# Patient Record
Sex: MALE | Race: WHITE | Employment: UNEMPLOYED | URBAN - METROPOLITAN AREA
[De-identification: names, ages, dates, MRNs, and addresses within clinical notes are randomized per-mention and may not be internally consistent; named-entity substitution may affect disease eponyms.]

---

## 2017-01-12 DIAGNOSIS — G20 DYSKINESIA DUE TO PARKINSON'S DISEASE (HCC): ICD-10-CM

## 2017-01-12 DIAGNOSIS — M54.2 NECK PAIN: ICD-10-CM

## 2017-01-12 DIAGNOSIS — M47.812 CERVICAL SPONDYLOSIS WITHOUT MYELOPATHY: ICD-10-CM

## 2017-01-12 DIAGNOSIS — G20 PARKINSON'S DISEASE (HCC): ICD-10-CM

## 2017-01-12 DIAGNOSIS — G24.9 DYSKINESIA DUE TO PARKINSON'S DISEASE (HCC): ICD-10-CM

## 2017-01-12 RX ORDER — TRIHEXYPHENIDYL HYDROCHLORIDE 2 MG/1
TABLET ORAL
Qty: 360 TAB | Refills: 1 | Status: SHIPPED | OUTPATIENT
Start: 2017-01-12 | End: 2017-07-09 | Stop reason: SDUPTHER

## 2017-01-12 NOTE — TELEPHONE ENCOUNTER
Future Appointments  Date Time Provider Maira Artis   5/18/2017 11:20 AM Pete Duarte MD [address]                         Last Appointment My Department:  11/1/2016    Please advise of refill below.   Requested Prescriptions     Pending Prescriptions Disp Refills    trihexyphenidyl (ARTANE) 2 mg tablet [Pharmacy Med Name: TRIHEXYPHENIDYL 2MG TABLETS] 360 Tab 1     Sig: TAKE 1 TABLET BY MOUTH FOUR TIMES DAILY

## 2017-03-13 DIAGNOSIS — M54.16 LUMBAR BACK PAIN WITH RADICULOPATHY AFFECTING LEFT LOWER EXTREMITY: ICD-10-CM

## 2017-03-13 DIAGNOSIS — G20 PARKINSON'S DISEASE (HCC): ICD-10-CM

## 2017-03-13 DIAGNOSIS — G24.9 DYSKINESIA DUE TO PARKINSON'S DISEASE (HCC): ICD-10-CM

## 2017-03-13 DIAGNOSIS — M54.2 NECK PAIN: ICD-10-CM

## 2017-03-13 DIAGNOSIS — M47.812 CERVICAL SPONDYLOSIS WITHOUT MYELOPATHY: ICD-10-CM

## 2017-03-13 DIAGNOSIS — G20 DYSKINESIA DUE TO PARKINSON'S DISEASE (HCC): ICD-10-CM

## 2017-03-13 RX ORDER — CARBIDOPA AND LEVODOPA 25; 100 MG/1; MG/1
TABLET ORAL
Qty: 540 TAB | Refills: 1 | Status: SHIPPED | OUTPATIENT
Start: 2017-03-13 | End: 2017-05-09 | Stop reason: SDUPTHER

## 2017-03-13 NOTE — TELEPHONE ENCOUNTER
Future Appointments  Date Time Provider Maira Artis   5/18/2017 11:20 AM Tram Shine MD [address]                         Last Appointment My Department:  11/1/2016    Please advise of refill below.   Requested Prescriptions     Pending Prescriptions Disp Refills    carbidopa-levodopa (SINEMET)  mg per tablet [Pharmacy Med Name: CARBIDOPA/LEVODOPA 25-100MG TABS] 540 Tab 1     Sig: TAKE 1 1/2 TABLETS BY MOUTH 4 TIMES A DAY

## 2017-05-09 ENCOUNTER — OFFICE VISIT (OUTPATIENT)
Dept: NEUROLOGY | Age: 52
End: 2017-05-09

## 2017-05-09 VITALS
BODY MASS INDEX: 33.88 KG/M2 | WEIGHT: 264 LBS | HEART RATE: 63 BPM | DIASTOLIC BLOOD PRESSURE: 62 MMHG | SYSTOLIC BLOOD PRESSURE: 90 MMHG | OXYGEN SATURATION: 96 % | HEIGHT: 74 IN

## 2017-05-09 DIAGNOSIS — G20 PARKINSON'S DISEASE (HCC): Primary | ICD-10-CM

## 2017-05-09 RX ORDER — CARBIDOPA AND LEVODOPA 25; 100 MG/1; MG/1
1.5 TABLET ORAL
Qty: 675 TAB | Refills: 1 | Status: SHIPPED | OUTPATIENT
Start: 2017-05-09 | End: 2017-11-17 | Stop reason: SDUPTHER

## 2017-05-09 NOTE — PATIENT INSTRUCTIONS
[facility]   Statement to Patients  April 1, 2014      In an effort to ensure the large volume of patient prescription refills is processed in the most efficient and expeditious manner, we are asking our patients to assist us by calling your Pharmacy for all prescription refills, this will include also your  Mail Order Pharmacy. The pharmacy will contact our office electronically to continue the refill process. Please do not wait until the last minute to call your pharmacy. We need at least 48 hours (2days) to fill prescriptions. We also encourage you to call your pharmacy before going to  your prescription to make sure it is ready. With regard to controlled substance prescription refill requests (narcotic refills) that need to be picked up at our office, we ask your cooperation by providing us with at least 72 hours (3days) notice that you will need a refill. We will not refill narcotic prescription refill requests after 4:00pm on any weekday, Monday through Thursday, or after 2:00pm on Fridays, or on the weekends. We encourage everyone to explore another way of getting your prescription refill request processed using [email or web address], our patient web portal through our electronic medical record system. [email or web address] is an efficient and effective way to communicate your medication request directly to the office and  downloadable as an ryan on your smart phone . [email or web address] also features a review functionality that allows you to view your medication list as well as leave messages for your physician. Are you ready to get connected? If so please review the attatched instructions or speak to any of our staff to get you set up right away! Thank you so much for your cooperation. Should you have any questions please contact our Practice Administrator.     The Physicians and Staff,  [facility]          A Healthy Lifestyle: Care Instructions  Your Care Instructions  A healthy lifestyle can help you feel good, stay at a healthy weight, and have plenty of energy for both work and play. A healthy lifestyle is something you can share with your whole family. A healthy lifestyle also can lower your risk for serious health problems, such as high blood pressure, heart disease, and diabetes. You can follow a few steps listed below to improve your health and the health of your family. Follow-up care is a key part of your treatment and safety. Be sure to make and go to all appointments, and call your doctor if you are having problems. Its also a good idea to know your test results and keep a list of the medicines you take. How can you care for yourself at home? · Do not eat too much sugar, fat, or fast foods. You can still have dessert and treats now and then. The goal is moderation. · Start small to improve your eating habits. Pay attention to portion sizes, drink less juice and soda pop, and eat more fruits and vegetables. ¨ Eat a healthy amount of food. A 3-ounce serving of meat, for example, is about the size of a deck of cards. Fill the rest of your plate with vegetables and whole grains. ¨ Limit the amount of soda and sports drinks you have every day. Drink more water when you are thirsty. ¨ Eat at least 5 servings of fruits and vegetables every day. It may seem like a lot, but it is not hard to reach this goal. A serving or helping is 1 piece of fruit, 1 cup of vegetables, or 2 cups of leafy, raw vegetables. Have an apple or some carrot sticks as an afternoon snack instead of a candy bar. Try to have fruits and/or vegetables at every meal.  · Make exercise part of your daily routine. You may want to start with simple activities, such as walking, bicycling, or slow swimming. Try to be active 30 to 60 minutes every day. You do not need to do all 30 to 60 minutes all at once. For example, you can exercise 3 times a day for 10 or 20 minutes.  Moderate exercise is safe for most people, but it is always a good idea to talk to your doctor before starting an exercise program.  · Keep moving. [employer] the lawn, work in the garden, or Cm[employer]. Take the stairs instead of the elevator at work. · If you smoke, quit. People who smoke have an increased risk for heart attack, stroke, cancer, and other lung illnesses. Quitting is hard, but there are ways to boost your chance of quitting tobacco for good. ¨ Use nicotine gum, patches, or lozenges. ¨ Ask your doctor about stop-smoking programs and medicines. ¨ Keep trying. In addition to reducing your risk of diseases in the future, you will notice some benefits soon after you stop using tobacco. If you have shortness of breath or asthma symptoms, they will likely get better within a few weeks after you quit. · Limit how much alcohol you drink. Moderate amounts of alcohol (up to 2 drinks a day for men, 1 drink a day for women) are okay. But drinking too much can lead to liver problems, high blood pressure, and other health problems. Family health  If you have a family, there are many things you can do together to improve your health. · Eat meals together as a family as often as possible. · Eat healthy foods. This includes fruits, vegetables, lean meats and dairy, and whole grains. · Include your family in your fitness plan. Most people think of activities such as jogging or tennis as the way to fitness, but there are many ways you and your family can be more active. Anything that makes you breathe hard and gets your heart pumping is exercise. Here are some tips:  ¨ Walk to do errands or to take your child to school or the bus. ¨ Go for a family bike ride after dinner instead of watching TV. Where can you learn more? Go to http://von-joanne.info/. Enter Q337 in the search box to learn more about \"A Healthy Lifestyle: Care Instructions. \"  Current as of: July 26, 2016  Content Version: 11.2  © 9262-9189 [employer]. Care instructions adapted under license by [employer] (which disclaims liability or warranty for this information). If you have questions about a medical condition or this instruction, always ask your healthcare professional. [address] any warranty or liability for your use of this information.

## 2017-05-09 NOTE — MR AVS SNAPSHOT
Visit Information Date & Time Provider Department Dept. Phone Encounter #  
 5/9/2017  1:30 PM Cynthia Baumann NP [facility] [phone] [id number] Your Appointments 8/21/2017  2:00 PM  
Follow Up with Nitesh Hughes MD  
[facility]) Appt Note: Follow up $0CP tdb 5/9/17  
 [address] [place] [address], 
[address] [place] [address], [address] [phone]  
[address], [address] [facility], [address] [address] Upcoming Health Maintenance Date Due DTaP/Tdap/Td series (1 - Tdap) 3/2/1988 FOBT Q 1 YEAR AGE 50-75 3/2/2017 INFLUENZA AGE 9 TO ADULT 8/1/2017 Allergies as of 5/9/2017  Review Complete On: 5/9/2017 By: Zakia Lake LPN No Known Allergies Current Immunizations  Never Reviewed No immunizations on file. Not reviewed this visit You Were Diagnosed With   
  
 Codes Comments Parkinson's disease ([facility]    -  Primary ICD-10-CM: G20 
ICD-9-CM: 332.0 Vitals BP Pulse Height(growth percentile) Weight(growth percentile) SpO2 BMI  
 90/62 63 6' 2\" (1.88 m) 264 lb (119.7 kg) 96% 33.9 kg/m2 Smoking Status Never Smoker Vitals History BMI and BSA Data Body Mass Index Body Surface Area 33.9 kg/m 2 2.5 m 2 Preferred Pharmacy Pharmacy Name Phone [phone], [address] [place] [address] AT [place] [address] [phone] Your Updated Medication List  
  
   
This list is accurate as of: 5/9/17  2:04 PM.  Always use your most recent med list.  
  
  
  
  
 carbidopa-levodopa  mg per tablet Commonly known as:  SINEMET Take 1.5 Tabs by mouth five (5) times daily. CENTRUM SILVER Tab tablet Generic drug:  multivitamins-minerals-lutein Take  by mouth. rotigotine 4 mg/24 hour patch Commonly known as:  NEUPRO  
1 Patch by TransDERmal route daily. selegiline 5 mg capsule Commonly known as:  ELDYPRYL Take 1 Cap by mouth two (2) times a day. trihexyphenidyl 2 mg tablet Commonly known as:  ARTANE  
TAKE 1 TABLET BY MOUTH FOUR TIMES DAILY  
  
 VITAMIN D3 1,000 unit Cap Generic drug:  cholecalciferol Take  by mouth. Prescriptions Sent to Pharmacy Refills  
 carbidopa-levodopa (SINEMET)  mg per tablet 1 Sig: Take 1.5 Tabs by mouth five (5) times daily. Class: Normal  
 [facility] [address] [place] [address] [address] [place] [address] Ph #: [phone] Route: Oral  
  
Patient Instructions PRESCRIPTION REFILL POLICY [facility] Statement to Patients April 1, 2014 In an effort to ensure the large volume of patient prescription refills is processed in the most efficient and expeditious manner, we are asking our patients to assist us by calling your Pharmacy for all prescription refills, this will include also your  Mail Order Pharmacy. The pharmacy will contact our office electronically to continue the refill process. Please do not wait until the last minute to call your pharmacy. We need at least 48 hours (2days) to fill prescriptions. We also encourage you to call your pharmacy before going to  your prescription to make sure it is ready. With regard to controlled substance prescription refill requests (narcotic refills) that need to be picked up at our office, we ask your cooperation by providing us with at least 72 hours (3days) notice that you will need a refill. We will not refill narcotic prescription refill requests after 4:00pm on any weekday, Monday through Thursday, or after 2:00pm on Fridays, or on the weekends.   
  
We encourage everyone to explore another way of getting your prescription refill request processed using Kiyon, our patient web portal through our electronic medical record system. Kiyon is an efficient and effective way to communicate your medication request directly to the office and  downloadable as an ryan on your smart phone . Kiyon also features a review functionality that allows you to view your medication list as well as leave messages for your physician. Are you ready to get connected? If so please review the attatched instructions or speak to any of our staff to get you set up right away! Thank you so much for your cooperation. Should you have any questions please contact our Practice Administrator. The Physicians and Staff,  [facility] A Healthy Lifestyle: Care Instructions Your Care Instructions A healthy lifestyle can help you feel good, stay at a healthy weight, and have plenty of energy for both work and play. A healthy lifestyle is something you can share with your whole family. A healthy lifestyle also can lower your risk for serious health problems, such as high blood pressure, heart disease, and diabetes. You can follow a few steps listed below to improve your health and the health of your family. Follow-up care is a key part of your treatment and safety. Be sure to make and go to all appointments, and call your doctor if you are having problems. Its also a good idea to know your test results and keep a list of the medicines you take. How can you care for yourself at home? · Do not eat too much sugar, fat, or fast foods. You can still have dessert and treats now and then. The goal is moderation. · Start small to improve your eating habits. Pay attention to portion sizes, drink less juice and soda pop, and eat more fruits and vegetables. ¨ Eat a healthy amount of food. A 3-ounce serving of meat, for example, is about the size of a deck of cards. Fill the rest of your plate with vegetables and whole grains. ¨ Limit the amount of soda and sports drinks you have every day. Drink more water when you are thirsty. ¨ Eat at least 5 servings of fruits and vegetables every day. It may seem like a lot, but it is not hard to reach this goal. A serving or helping is 1 piece of fruit, 1 cup of vegetables, or 2 cups of leafy, raw vegetables. Have an apple or some carrot sticks as an afternoon snack instead of a candy bar. Try to have fruits and/or vegetables at every meal. 
· Make exercise part of your daily routine. You may want to start with simple activities, such as walking, bicycling, or slow swimming. Try to be active 30 to 60 minutes every day. You do not need to do all 30 to 60 minutes all at once. For example, you can exercise 3 times a day for 10 or 20 minutes. Moderate exercise is safe for most people, but it is always a good idea to talk to your doctor before starting an exercise program. 
· Keep moving. Rosana FuentesEtaphase the lawn, work in the garden, or [employer]. Take the stairs instead of the elevator at work. · If you smoke, quit. People who smoke have an increased risk for heart attack, stroke, cancer, and other lung illnesses. Quitting is hard, but there are ways to boost your chance of quitting tobacco for good. ¨ Use nicotine gum, patches, or lozenges. ¨ Ask your doctor about stop-smoking programs and medicines. ¨ Keep trying. In addition to reducing your risk of diseases in the future, you will notice some benefits soon after you stop using tobacco. If you have shortness of breath or asthma symptoms, they will likely get better within a few weeks after you quit. · Limit how much alcohol you drink. Moderate amounts of alcohol (up to 2 drinks a day for men, 1 drink a day for women) are okay. But drinking too much can lead to liver problems, high blood pressure, and other health problems. Family health If you have a family, there are many things you can do together to improve your health. · Eat meals together as a family as often as possible. · Eat healthy foods. This includes fruits, vegetables, lean meats and dairy, and whole grains. · Include your family in your fitness plan. Most people think of activities such as jogging or tennis as the way to fitness, but there are many ways you and your family can be more active. Anything that makes you breathe hard and gets your heart pumping is exercise. Here are some tips: 
¨ Walk to do errands or to take your child to school or the bus. ¨ Go for a family bike ride after dinner instead of watching TV. Where can you learn more? Go to http://vonDokogeojoanne.info/. Enter L089 in the search box to learn more about \"A Healthy Lifestyle: Care Instructions. \" Current as of: July 26, 2016 Content Version: 11.2 © 4754-8815 [employer]. Care instructions adapted under license by [employer] (which disclaims liability or warranty for this information). If you have questions about a medical condition or this instruction, always ask your healthcare professional. [address] any warranty or liability for your use of this information. Introducing [facility]! Greg Goodrich introduces mig33 patient portal. Now you can access parts of your medical record, email your doctor's office, and request medication refills online. 1. In your internet browser, go to [email or web address] 2. Click on the First Time User? Click Here link in the Sign In box. You will see the New Member Sign Up page. 3. Enter your [email or web address] Access Code exactly as it appears below. You will not need to use this code after youve completed the sign-up process. If you do not sign up before the expiration date, you must request a new code. · [email or web address] Access Code: [email or web address] Expires: 8/7/2017  1:20 PM 
 
4.  Enter the last four digits of your Social Security Number (xxxx) and Date of Birth (mm/dd/yyyy) as indicated and click Submit. You will be taken to the next sign-up page. 5. Create a Spring Metrics ID. This will be your Spring Metrics login ID and cannot be changed, so think of one that is secure and easy to remember. 6. Create a Spring Metrics password. You can change your password at any time. 7. Enter your Password Reset Question and Answer. This can be used at a later time if you forget your password. 8. Enter your e-mail address. You will receive e-mail notification when new information is available in [address] 9. Click Sign Up. You can now view and download portions of your medical record. 10. Click the Download Summary menu link to download a portable copy of your medical information. If you have questions, please visit the Frequently Asked Questions section of the Spring Metrics website. Remember, Spring Metrics is NOT to be used for urgent needs. For medical emergencies, dial 911. Now available from your iPhone and Android! Please provide this summary of care documentation to your next provider. Your primary care clinician is listed as Adelina Quezadaite. If you have any questions after today's visit, please call [phone].

## 2017-05-09 NOTE — PROGRESS NOTES
Date:           May 9, 2017    Name:  Olive Ward  :  1965  MRN:  [id number]     PCP:  Manuel Quezada MD    Chief Complaint   Patient presents with    Follow-up    Tremors         HISTORY OF PRESENT ILLNESS:  Gurmeet Green is a 50 y.o., male who presents today for follow up for Parkinson's. Since he was last seen, he tried eldrpryl and it did not help. He went back on the neupro patch 4 mg and is not sure that it helps either. When he misses a day, he does not notice much of a difference. He is falling, probably a few times a month. He tends to fall backward. Lorenso Frankel He continues with artane 4 times a day and sinemet  mg 1.5 mg qid. He thinks that medication might be wearing off when he is due for another pill. The first dose really helps, the second dose does not seem to do much, the third dose usually does help. He has a hard time keeping up with his pill schedule because he works overnight and has to adjust it. He does think that he would benefit from taking an additional dose though. He sleeps well, is not restless, but never gets a full 8 hours due to his work schedule.     2016 recap  Gurmeet Green is a 54 y.o., male who presents today for follow up for parkinsonism. He takes Sinemet  (1.5 tabs mg) qid and does find that it wears off after about 3-4 hours. He does work part time and often at night, so he will have to adjust his sinemet dosing to account for that. His feet will start shuffling and he will have more balance issues when he is due for another dose. He admits to falling about once a month. He has been doing PT for balance and does think this is helping, is doing his homework to maintain benefit. Was taking requip, and thinks that it was helpful. Was switched to the neupro patch, not sure if it has helped any more than requip. He has missed a patch here or there and can't tell a difference when he does, he still has off periods.  Wonders if he could try selegiline instead due to cost. He does sleep well at night. Denies dyskinesia or tremor. Current Outpatient Prescriptions   Medication Sig    carbidopa-levodopa (SINEMET)  mg per tablet TAKE 1 1/2 TABLETS BY MOUTH 4 TIMES A DAY    trihexyphenidyl (ARTANE) 2 mg tablet TAKE 1 TABLET BY MOUTH FOUR TIMES DAILY    rotigotine (NEUPRO) 4 mg/24 hour patch 1 Patch by TransDERmal route daily.  selegiline (ELDYPRYL) 5 mg capsule Take 1 Cap by mouth two (2) times a day.  multivitamins-minerals-lutein (CENTRUM SILVER) Tab Take  by mouth.  Cholecalciferol, Vitamin D3, (VITAMIN D3) 1,000 unit cap Take  by mouth. No current facility-administered medications for this visit. No Known Allergies  Past Medical History:   Diagnosis Date    Kidney disease     Movement disorder     Nephrolithiasis     Neurological disorder     Other ill-defined conditions     Parkinson disease    Parkinson disease ([facility]      Past Surgical History:   Procedure Laterality Date    HX VEIN STRIPPING       Social History     Social History    Marital status:      Spouse name: N/A    Number of children: N/A    Years of education: N/A     Occupational History    Not on file. Social History Main Topics    Smoking status: Never Smoker    Smokeless tobacco: Not on file    Alcohol use Yes      Comment: rare    Drug use: No    Sexual activity: Not on file     Other Topics Concern    Not on file     Social History Narrative     Family History   Problem Relation Age of Onset    Hypertension Mother     Heart Disease Mother     Parkinsonism Father     Arthritis-osteo Father     Seizures Sister          PHYSICAL EXAMINATION:    Visit Vitals    BP 90/62    Pulse 63    Ht 6' 2\" (1.88 m)    Wt 264 lb (119.7 kg)    SpO2 96%    BMI 33.9 kg/m2     General:  Well defined, nourished, and groomed individual in no acute distress. Neck: Supple, nontender, no bruits, no pain with resistance to active range of motion. Heart: Regular rate and rhythm, no murmurs, rub, or gallop. Normal S1S2. Lungs:  Clear to auscultation bilaterally with equal chest expansion, no cough, no wheeze  Musculoskeletal:  Extremities revealed no edema and had full range of motion of joints. Psych:  Good mood and bright affect    NEUROLOGICAL EXAMINATION:     Mental Status:   Alert and oriented to person, place, and time with recent and remote memory intact. Attention span and concentration are normal. Speech is fluent with a full fund of knowledge. Cranial Nerves:    II, III, IV, VI:  Visual acuity grossly intact. Pupils are equal, round, and reactive to light. Extra-ocular movements are full and fluid. No ptosis or nystagmus. V-XII: Hearing is grossly intact. Facial features are symmetric, with normal sensation and strength. The palate rises symmetrically and the tongue protrudes midline. Sternocleidomastoids 5/5. Masked facies    Motor Examination: Normal tone, bulk, and strength, 5/5 muscle strength throughout. No cogwheel rigidity  Coordination:  Finger to nose testing was normal.   No resting or intention tremor. Mild bilateral bradykinesia, worse on the left hand  Gait and Station:  Steady while walking. Normal arm swing. No pronator drift. No muscle wasting or fasciculations noted. ASSESSMENT AND PLAN    ICD-10-CM ICD-9-CM    1. [facility]'s [facility] G20 332.0 carbidopa-levodopa (SINEMET)  mg per tablet     82-year-old male seen in follow-up of Parkinson's disease. He has tried Neupro patch and Eldepryl since he was last seen, does not think that either one is helped. For the most part, he feels his problem that is that his Sinemet wears off when he is due for another dose. He finds himself very anxious to take the next one exam is unremarkable today, but he also took his Sinemet about an hour ago. 1.  Increase Sinemet to 1.5 tabs 5 times a day  2. Continue Neupro 4 mg daily  3.   Continue Artane 2 mg 4 times a day  4. Patient is instructed to call the office if he would like to increase Sinemet to 2 tabs instead of 1.5    Follow-up in 3 months, call sooner with concerns    25+ minutes, >50% discussing above/answering questions/formulating plan      Surekha Garcia NP    This note was created using voice recognition software. Despite editing, there may be syntax errors.

## 2017-07-09 DIAGNOSIS — M54.2 NECK PAIN: ICD-10-CM

## 2017-07-09 DIAGNOSIS — G20 DYSKINESIA DUE TO PARKINSON'S DISEASE (HCC): ICD-10-CM

## 2017-07-09 DIAGNOSIS — G24.9 DYSKINESIA DUE TO PARKINSON'S DISEASE (HCC): ICD-10-CM

## 2017-07-09 DIAGNOSIS — M47.812 CERVICAL SPONDYLOSIS WITHOUT MYELOPATHY: ICD-10-CM

## 2017-07-09 DIAGNOSIS — G20 PARKINSON'S DISEASE (HCC): ICD-10-CM

## 2017-07-10 RX ORDER — TRIHEXYPHENIDYL HYDROCHLORIDE 2 MG/1
TABLET ORAL
Qty: 360 TAB | Refills: 1 | Status: SHIPPED | OUTPATIENT
Start: 2017-07-10 | End: 2017-08-21 | Stop reason: SDUPTHER

## 2017-07-10 NOTE — TELEPHONE ENCOUNTER
Future Appointments  Date Time Provider Maira Steff   8/21/2017 2:00 PM Marcin Lisa MD [address]                         Last Appointment My Department:  5/9/2017    Please advise of refill below.   Requested Prescriptions     Pending Prescriptions Disp Refills    trihexyphenidyl (ARTANE) 2 mg tablet [Pharmacy Med Name: TRIHEXYPHENIDYL 2MG TABLETS] 360 Tab 1     Sig: TAKE 1 TABLET BY MOUTH FOUR TIMES DAILY

## 2017-08-21 ENCOUNTER — OFFICE VISIT (OUTPATIENT)
Dept: NEUROLOGY | Age: 52
End: 2017-08-21

## 2017-08-21 VITALS
DIASTOLIC BLOOD PRESSURE: 68 MMHG | OXYGEN SATURATION: 95 % | RESPIRATION RATE: 16 BRPM | HEART RATE: 91 BPM | HEIGHT: 74 IN | BODY MASS INDEX: 33.75 KG/M2 | SYSTOLIC BLOOD PRESSURE: 110 MMHG | WEIGHT: 263 LBS

## 2017-08-21 DIAGNOSIS — M54.40 BACK PAIN OF LUMBAR REGION WITH SCIATICA: Primary | ICD-10-CM

## 2017-08-21 DIAGNOSIS — M47.812 CERVICAL SPONDYLOSIS WITHOUT MYELOPATHY: ICD-10-CM

## 2017-08-21 DIAGNOSIS — M54.2 NECK PAIN: ICD-10-CM

## 2017-08-21 DIAGNOSIS — G20 DYSKINESIA DUE TO PARKINSON'S DISEASE (HCC): ICD-10-CM

## 2017-08-21 DIAGNOSIS — M54.16 LUMBAR BACK PAIN WITH RADICULOPATHY AFFECTING LEFT LOWER EXTREMITY: ICD-10-CM

## 2017-08-21 DIAGNOSIS — G20 PARKINSON'S DISEASE (HCC): ICD-10-CM

## 2017-08-21 DIAGNOSIS — G24.9 DYSKINESIA DUE TO PARKINSON'S DISEASE (HCC): ICD-10-CM

## 2017-08-21 RX ORDER — TRIHEXYPHENIDYL HYDROCHLORIDE 2 MG/1
2 TABLET ORAL 3 TIMES DAILY
Qty: 360 TAB | Refills: 1 | Status: SHIPPED | OUTPATIENT
Start: 2017-08-21 | End: 2018-09-06 | Stop reason: SDUPTHER

## 2017-08-21 RX ORDER — ROPINIROLE 1 MG/1
1 TABLET, FILM COATED ORAL 3 TIMES DAILY
Qty: 100 TAB | Refills: 6 | Status: SHIPPED | OUTPATIENT
Start: 2017-08-21 | End: 2017-12-05 | Stop reason: ALTCHOICE

## 2017-08-21 NOTE — PATIENT INSTRUCTIONS

## 2017-08-21 NOTE — MR AVS SNAPSHOT
Visit Information Date & Time Provider Department Dept. Phone Encounter #  
 8/21/2017  2:00 PM Mari Banks MD [facility] [phone] [id number] Follow-up Instructions Return in about 6 months (around 2/21/2018). Upcoming Health Maintenance Date Due DTaP/Tdap/Td series (1 - Tdap) 3/2/1988 FOBT Q 1 YEAR AGE 50-75 3/2/2017 INFLUENZA AGE 9 TO ADULT 8/1/2017 Allergies as of 8/21/2017  Review Complete On: 8/21/2017 By: Mari Banks MD  
 No Known Allergies Current Immunizations  Never Reviewed No immunizations on file. Not reviewed this visit You Were Diagnosed With   
  
 Codes Comments Back pain of lumbar region with sciatica    -  Primary ICD-10-CM: M54.40 ICD-9-CM: 724.2, 724.3 Neck pain     ICD-10-CM: M54.2 ICD-9-CM: 723.1 Cervical spondylosis without myelopathy     ICD-10-CM: Z67.478 ICD-9-CM: 721.0 Lumbar back pain with radiculopathy affecting left lower extremity     ICD-10-CM: M54.17 ICD-9-CM: 724.4 Parkinson's disease ([facility]     ICD-10-CM: G20 
ICD-9-CM: 332.0 Dyskinesia due to Parkinson's disease ([facility]     ICD-10-CM: G24.9, G20 
ICD-9-CM: 781.3, 332.0 Vitals BP Pulse Height(growth percentile) Weight(growth percentile) SpO2 BMI  
 110/68 91 6' 2\" (1.88 m) 263 lb (119.3 kg) 95% 33.77 kg/m2 Smoking Status Never Smoker BMI and BSA Data Body Mass Index Body Surface Area  
 33.77 kg/m 2 2.5 m 2 Preferred Pharmacy Pharmacy Name Phone [phone] [phone], [address] AT [facility] [phone] Your Updated Medication List  
  
   
This list is accurate as of: 8/21/17  2:36 PM.  Always use your most recent med list.  
  
  
  
  
 carbidopa-levodopa  mg per tablet Commonly known as:  SINEMET Take 1.5 Tabs by mouth five (5) times daily. CENTRUM SILVER Tab tablet Generic drug:  multivitamins-minerals-lutein Take  by mouth. rOPINIRole 1 mg tablet Commonly known as:  Jacinto Stare Take 1 Tab by mouth three (3) times daily. selegiline 5 mg capsule Commonly known as:  ELDYPRYL Take 1 Cap by mouth two (2) times a day. trihexyphenidyl 2 mg tablet Commonly known as:  ARTANE Take 1 Tab by mouth three (3) times daily. VITAMIN D3 1,000 unit Cap Generic drug:  cholecalciferol Take  by mouth. Prescriptions Sent to Pharmacy Refills  
 rOPINIRole (REQUIP) 1 mg tablet 6 Sig: Take 1 Tab by mouth three (3) times daily. Class: Normal  
 Pharmacy: [facility] [address] Ph #: [phone] Route: Oral  
 trihexyphenidyl (ARTANE) 2 mg tablet 1 Sig: Take 1 Tab by mouth three (3) times daily. Class: Normal  
 Pharmacy: [address] Ph #: [phone] Route: Oral  
  
Follow-up Instructions Return in about 6 months (around 2/21/2018). Patient Instructions A Healthy Lifestyle: Care Instructions Your Care Instructions A healthy lifestyle can help you feel good, stay at a healthy weight, and have plenty of energy for both work and play. A healthy lifestyle is something you can share with your whole family. A healthy lifestyle also can lower your risk for serious health problems, such as high blood pressure, heart disease, and diabetes. You can follow a few steps listed below to improve your health and the health of your family. Follow-up care is a key part of your treatment and safety. Be sure to make and go to all appointments, and call your doctor if you are having problems. Its also a good idea to know your test results and keep a list of the medicines you take. How can you care for yourself at home? · Do not eat too much sugar, fat, or fast foods. You can still have dessert and treats now and then. The goal is moderation. · Start small to improve your eating habits. Pay attention to portion sizes, drink less juice and soda pop, and eat more fruits and vegetables. ¨ Eat a healthy amount of food. A 3-ounce serving of meat, for example, is about the size of a deck of cards. Fill the rest of your plate with vegetables and whole grains. ¨ Limit the amount of soda and sports drinks you have every day. Drink more water when you are thirsty. ¨ Eat at least 5 servings of fruits and vegetables every day. It may seem like a lot, but it is not hard to reach this goal. A serving or helping is 1 piece of fruit, 1 cup of vegetables, or 2 cups of leafy, raw vegetables. Have an apple or some carrot sticks as an afternoon snack instead of a candy bar. Try to have fruits and/or vegetables at every meal. 
· Make exercise part of your daily routine. You may want to start with simple activities, such as walking, bicycling, or slow swimming. Try to be active 30 to 60 minutes every day. You do not need to do all 30 to 60 minutes all at once. For example, you can exercise 3 times a day for 10 or 20 minutes. Moderate exercise is safe for most people, but it is always a good idea to talk to your doctor before starting an exercise program. 
· Keep moving. Vito [employer] the lawn, work in the garden, or Produce Run. Take the stairs instead of the elevator at work. · If you smoke, quit. People who smoke have an increased risk for heart attack, stroke, cancer, and other lung illnesses. Quitting is hard, but there are ways to boost your chance of quitting tobacco for good. ¨ Use nicotine gum, patches, or lozenges. ¨ Ask your doctor about stop-smoking programs and medicines. ¨ Keep trying.  
In addition to reducing your risk of diseases in the future, you will notice some benefits soon after you stop using tobacco. If you have shortness of breath or asthma symptoms, they will likely get better within a few weeks after you quit. · Limit how much alcohol you drink. Moderate amounts of alcohol (up to 2 drinks a day for men, 1 drink a day for women) are okay. But drinking too much can lead to liver problems, high blood pressure, and other health problems. Family health If you have a family, there are many things you can do together to improve your health. · Eat meals together as a family as often as possible. · Eat healthy foods. This includes fruits, vegetables, lean meats and dairy, and whole grains. · Include your family in your fitness plan. Most people think of activities such as jogging or tennis as the way to fitness, but there are many ways you and your family can be more active. Anything that makes you breathe hard and gets your heart pumping is exercise. Here are some tips: 
¨ Walk to do errands or to take your child to school or the bus. ¨ Go for a family bike ride after dinner instead of watching TV. Where can you learn more? Go to http://vonPhoneGuardjoanne.info/. Enter O574 in the search box to learn more about \"A Healthy Lifestyle: Care Instructions. \" Current as of: July 26, 2016 Content Version: 11.3 © 0585-4131 [employer]. Care instructions adapted under license by [employer] (which disclaims liability or warranty for this information). If you have questions about a medical condition or this instruction, always ask your healthcare professional. [place] [address] any warranty or liability for your use of this information. Introducing [address]! Gautam Montez introduces Kickfire patient portal. Now you can access parts of your medical record, email your doctor's office, and request medication refills online.    
 
1. In your internet browser, go to [email or web address] 2. Click on the First Time User? Click Here link in the Sign In box. You will see the New Member Sign Up page. 3. Enter your onlinetours Access Code exactly as it appears below. You will not need to use this code after youve completed the sign-up process. If you do not sign up before the expiration date, you must request a new code. · onlinetours Access Code: [email or web address] Expires: 11/19/2017  2:12 PM 
 
4. Enter the last four digits of your Social Security Number (xxxx) and Date of Birth (mm/dd/yyyy) as indicated and click Submit. You will be taken to the next sign-up page. 5. Create a YouOSt ID. This will be your onlinetours login ID and cannot be changed, so think of one that is secure and easy to remember. 6. Create a onlinetours password. You can change your password at any time. 7. Enter your Password Reset Question and Answer. This can be used at a later time if you forget your password. 8. Enter your e-mail address. You will receive e-mail notification when new information is available in [address] 9. Click Sign Up. You can now view and download portions of your medical record. 10. Click the Download Summary menu link to download a portable copy of your medical information. If you have questions, please visit the Frequently Asked Questions section of the onlinetours website. Remember, onlinetours is NOT to be used for urgent needs. For medical emergencies, dial 911. Now available from your iPhone and Android! Please provide this summary of care documentation to your next provider. Your primary care clinician is listed as Patrick Zarate. If you have any questions after today's visit, please call [phone].

## 2017-08-22 NOTE — PROGRESS NOTES
Consult    Subjective:     Gisselle Montalvo is a 50 y. o.right-handed  male seen today for evaluation of New problem of increasing difficulty trying to afford his medications, and does not think he can afford the Neupro patches anymore at the request of Dr. Kun Bermudez. Patient also having more difficulty with his balance and coordination, and I do not think he is always taking his medications right. 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 I am concerned he may be getting too much of the Artane. I asked him to decrease his Artane is 3 times a day because I am not sure that is not causing some of his problems. He does have dyskinesias from too much medication when he overuses it. He sometimes increases his Sinemet to 1-1/2 tablets 5 times a day. He thought Mirapex made him sick I offered him go back on Requip which she can afford we will start him at 1 mg 3 times a day. I told him is not much else they are available. He is on Eldepryl twice a day because he cannot afford the Azilect, so I do not think he will be able to afford the new monoamine oxidase inhibitor just approved for Parkinson's disease. His back is gotten somewhat better with physical therapy. He is limited in his finances and I am not sure he will be able to afford Jair Davila. He has had no clear focal weakness, and only migratory sensory loss in his leg. He is still able to work some, and states that he thinks he might be getting slowly better and wants to wait before any diagnostic testing done. His bowel and bladder functions normal and he has no radicular symptoms into the left leg. He is seeing also today for evaluation of his Parkinson's disease and increasing dystonia on early morning awakening, that is painful and takes several hours to resolve after he takes his medications.  He was started on Neupro patch 4 mg and has done much better since that time. Patient stopped his Requip. He has mild dyskinesia but is moving well today. He most likely had end of dose dystonia. Patient has a 16 year history of Parkinson's disease, and a problem with neck pain and tightness in his neck and headaches in the posterior head regions. He seems to show restrictive range of motion particularly to the right on exam suggesting degenerative arthritis of his neck, and cervical spine x-rays do show some mild degenerative changes at C5-6. He has Parkinson's disease which has mild fluctuations still, but overall, the patient is tolerating his medication well, and he is on Sinemet 25/100 1-1/2 pills 4-5 times a day and he is also taking Artane 2 mg 4 times a day. He is tolerating the medication well, and has not noticed any side effects, but I can see he is having some mild dyskinesia with the medicines which he says does not bother him. He's had no new weakness or sensory loss visual changes cognitive issues or gait problems. He is taking his vitamins regularly and we did advise him he needs to do that and his vitamin D. He could not afford Azilect in the past but he now has insurance and he may be restarted on it at our next visit if he needs it. Today he looks stable     His complete review of systems and symptoms was negative for any other new medical problems, complications or illnesses. He has a history of Parkinson's disease, kidney stones, neck pain and cervical spondylosis and history of vein stripping.     Past Medical History:   Diagnosis Date    Kidney disease     Movement disorder     Nephrolithiasis     Neurological disorder     Other ill-defined conditions     Parkinson disease    Parkinson disease ([address]       Past Surgical History:   Procedure Laterality Date    HX VEIN STRIPPING       Family History   Problem Relation Age of Onset    Hypertension Mother     Heart Disease Mother     Parkinsonism Father     Arthritis-osteo Father    [facility] [place] Seiz[facility] Sister       Social History   Substance Use Topics    Smoking status: Never Smoker    Smokeless tobacco: Never Used    Alcohol use Yes      Comment: rare       Current Outpatient Prescriptions   Medication Sig Dispense Refill    rOPINIRole (REQUIP) 1 mg tablet Take 1 Tab by mouth three (3) times daily. 100 Tab 6    trihexyphenidyl (ARTANE) 2 mg tablet Take 1 Tab by mouth three (3) times daily. 360 Tab 1    carbidopa-levodopa (SINEMET)  mg per tablet Take 1.5 Tabs by mouth five (5) times daily. 675 Tab 1    selegiline (ELDYPRYL) 5 mg capsule Take 1 Cap by mouth two (2) times a day. 60 Cap 5    multivitamins-minerals-lutein (CENTRUM SILVER) Tab Take  by mouth.  Cholecalciferol, Vitamin D3, (VITAMIN D3) 1,000 unit cap Take  by mouth. No Known Allergies     Review of Systems:  A comprehensive review of systems was negative except for: Neurological: positive for coordination problems, gait problems and tremor     Objective:     I      NEUROLOGICAL EXAM:    Appearance: The patient is well developed, well nourished, provides a coherent history and is in no acute distress. Mental Status: Oriented to time, place and person and the president, cognitive function and fund of knowledge seemed normal though he is a little slow mentally. Carlynn Roxana Speech is fluent without aphasia or dysarthria. Mood and affect appropriate. Cranial Nerves:   Intact visual fields. Fundi are benign. JUAN M, EOM's full, no nystagmus, no ptosis. Facial sensation is normal. Corneal reflexes are not tested. Facial movement is symmetric, but the patient has mild bilateral facial dyskinesias. Hearing is normal bilaterally. Palate is midline with normal sternocleidomastoid and trapezius muscles are normal. Tongue is midline. Motor:  5/5 strength in upper and lower proximal and distal muscles.  Normal bulk and mild increased tone with cogwheel rigidity and bradykinesia in both upper extremities right side slightly greater than the left. No fasciculations. Patient has mild dyskinesias of the face and extremities bilaterally. Patient has mild decreased range of motion of the neck on exam particularly to the right, with some tenderness of the cervical muscles. Reflexes:   Deep tendon reflexes 2+/4 and symmetrical. No Babinski or clonus present. Patient has negative straight-leg raising test in the sitting position bilaterally  Patient has mild percussion tenderness over the lower lumbar spine  Patient can almost bend over and touch his toes   Sensory:   Normal to touch, pinprick and vibration and DSS. Gait:  Abnormal gait for slight dyskinesia in his arms and legs and slightly decreased arm swing in his right upper extremity. Tremor:   No tremor noted. Cerebellar:   Mildly abnormal Romberg and tandem cerebellar signs present. Neurovascular:  Normal heart sounds and regular rhythm, peripheral pulses decreased in his feet, and no carotid bruits. Assessment:         Plan:     Parkinson's disease with increasing morning end of dose dystonia, better on Neupro patch 4 mg but he can afford that and needs a substitute  We will restart Requip 1 mg 3 times a day and advance as tolerated  Because of his increasing ataxia and unsteady gait we will try tapering him off Artane he will reduce his dose down to 3 times a day now  Cervical spondylosis with muscle contraction headaches  Lumbar radiculopathy into the right leg probably secondary to degenerative disc disease  Patient is to make sure he does his exercises for cervical spondylosis and lumbar spinal stenosis  We discussed medications in detail, and I told him I did not think we do not need to increase his medications now  Patient is to continue his neck exercises, and advised to take anti-inflammatories as needed and continue a regular exercise program.  Patient is to continue his current dose of other medications.    He's doing a little worse with his gait and dyskinesias  He is to remain mentally and physically active and start taking vitamins and vitamin D more regularly and try to exercise more. He'll be seen again in 12 months time or earlier if needed    Signed By: Heriberto Goldmann, MD     August 21, 2017       This note will not be viewable in [address]

## 2017-10-24 ENCOUNTER — TELEPHONE (OUTPATIENT)
Dept: NEUROLOGY | Age: 52
End: 2017-10-24

## 2017-10-24 NOTE — TELEPHONE ENCOUNTER
Received call from patient, he wants to switch medications, patient would indulge which medication he wanted to switch. He would like a call back.     [phone]

## 2017-10-26 ENCOUNTER — TELEPHONE (OUTPATIENT)
Dept: NEUROLOGY | Age: 52
End: 2017-10-26

## 2017-11-17 DIAGNOSIS — G20 PARKINSON'S DISEASE (HCC): ICD-10-CM

## 2017-11-17 RX ORDER — CARBIDOPA AND LEVODOPA 25; 100 MG/1; MG/1
TABLET ORAL
Qty: 675 TAB | Refills: 0 | Status: SHIPPED | OUTPATIENT
Start: 2017-11-17 | End: 2018-02-21 | Stop reason: SDUPTHER

## 2017-12-05 RX ORDER — ROTIGOTINE 4 MG/24H
PATCH, EXTENDED RELEASE TRANSDERMAL
Qty: 30 PATCH | Refills: 0 | Status: SHIPPED | OUTPATIENT
Start: 2017-12-05 | End: 2018-02-12 | Stop reason: CLARIF

## 2018-01-07 DIAGNOSIS — G24.9 DYSKINESIA DUE TO PARKINSON'S DISEASE (HCC): ICD-10-CM

## 2018-01-07 DIAGNOSIS — G20 DYSKINESIA DUE TO PARKINSON'S DISEASE (HCC): ICD-10-CM

## 2018-01-07 DIAGNOSIS — M47.812 CERVICAL SPONDYLOSIS WITHOUT MYELOPATHY: ICD-10-CM

## 2018-01-07 DIAGNOSIS — M54.2 NECK PAIN: ICD-10-CM

## 2018-01-07 DIAGNOSIS — G20 PARKINSON'S DISEASE (HCC): ICD-10-CM

## 2018-01-07 RX ORDER — TRIHEXYPHENIDYL HYDROCHLORIDE 2 MG/1
TABLET ORAL
Qty: 360 TAB | Refills: 0 | Status: SHIPPED | OUTPATIENT
Start: 2018-01-07 | End: 2018-02-12

## 2018-01-08 ENCOUNTER — TELEPHONE (OUTPATIENT)
Dept: NEUROLOGY | Age: 53
End: 2018-01-08

## 2018-01-08 NOTE — TELEPHONE ENCOUNTER
Pharmacy would like clarification on direction for trihexphenidyl an old prescription has pt taking medication 4 times daily with 360 pills and a new prescription was written for 3 times daily with 360 pills

## 2018-01-08 NOTE — TELEPHONE ENCOUNTER
Confirmed with the pharmacy that per note from 8/21/17, patient is to decrease to three times daily of this medication and he verbalized understanding

## 2018-01-23 DIAGNOSIS — M47.812 CERVICAL SPONDYLOSIS WITHOUT MYELOPATHY: ICD-10-CM

## 2018-01-23 DIAGNOSIS — M43.16 SPONDYLOLISTHESIS OF LUMBAR REGION: ICD-10-CM

## 2018-01-23 DIAGNOSIS — G20 DYSKINESIA DUE TO PARKINSON'S DISEASE (HCC): ICD-10-CM

## 2018-01-23 DIAGNOSIS — G24.9 DYSKINESIA DUE TO PARKINSON'S DISEASE (HCC): ICD-10-CM

## 2018-01-23 DIAGNOSIS — G20 PARKINSON'S DISEASE (HCC): ICD-10-CM

## 2018-01-23 DIAGNOSIS — M54.16 LUMBAR BACK PAIN WITH RADICULOPATHY AFFECTING LEFT LOWER EXTREMITY: ICD-10-CM

## 2018-01-23 DIAGNOSIS — M54.2 NECK PAIN: ICD-10-CM

## 2018-01-23 RX ORDER — ROTIGOTINE 4 MG/24H
PATCH, EXTENDED RELEASE TRANSDERMAL
Qty: 30 PATCH | Refills: 0 | Status: SHIPPED | OUTPATIENT
Start: 2018-01-23 | End: 2018-02-12 | Stop reason: CLARIF

## 2018-02-08 ENCOUNTER — TELEPHONE (OUTPATIENT)
Dept: NEUROLOGY | Age: 53
End: 2018-02-08

## 2018-02-12 ENCOUNTER — OFFICE VISIT (OUTPATIENT)
Dept: NEUROLOGY | Age: 53
End: 2018-02-12

## 2018-02-12 VITALS
HEART RATE: 67 BPM | WEIGHT: 262 LBS | BODY MASS INDEX: 33.62 KG/M2 | DIASTOLIC BLOOD PRESSURE: 74 MMHG | SYSTOLIC BLOOD PRESSURE: 98 MMHG | HEIGHT: 74 IN | RESPIRATION RATE: 16 BRPM | OXYGEN SATURATION: 98 % | TEMPERATURE: 98 F

## 2018-02-12 DIAGNOSIS — M54.16 LUMBAR BACK PAIN WITH RADICULOPATHY AFFECTING LEFT LOWER EXTREMITY: ICD-10-CM

## 2018-02-12 DIAGNOSIS — G20 PARKINSON'S DISEASE (HCC): ICD-10-CM

## 2018-02-12 DIAGNOSIS — G24.9 DYSKINESIA DUE TO PARKINSON'S DISEASE (HCC): Primary | ICD-10-CM

## 2018-02-12 DIAGNOSIS — G20 DYSKINESIA DUE TO PARKINSON'S DISEASE (HCC): Primary | ICD-10-CM

## 2018-02-12 DIAGNOSIS — M47.812 CERVICAL SPONDYLOSIS WITHOUT MYELOPATHY: ICD-10-CM

## 2018-02-12 DIAGNOSIS — M54.40 BACK PAIN OF LUMBAR REGION WITH SCIATICA: ICD-10-CM

## 2018-02-12 RX ORDER — SELEGILINE HYDROCHLORIDE 5 MG/1
5 CAPSULE ORAL 2 TIMES DAILY
Qty: 60 CAP | Refills: 5 | Status: SHIPPED | OUTPATIENT
Start: 2018-02-12 | End: 2018-05-16 | Stop reason: CLARIF

## 2018-02-12 NOTE — LETTER
2/12/2018 8:20 PM 
 
Patient:  Jabier Canseco YOB: 1965 Date of Visit: 2/12/2018 Dear No Recipients: Thank you for referring Mr. Kamala Tovar to me for evaluation/treatment. Below are the relevant portions of my assessment and plan of care. Consult Subjective:  
 
Jabier Canseco is a 50 y. o.right-handed  male seen today for evaluation of New problem of wanting to switch his medications to Eldepryl, because his dad is doing better on that medication, and for increasing difficulty trying to afford his medications, and does not think he can afford the Neupro patches anymore at the request of Dr. Arturo Ortiz, and wanted to switch back to Requip. We told the patient to go ahead and finish off his Neupro patches, and we gave her new prescription for the Eldepryl 5 mg twice a day at breakfast and lunch, and once he has finished his patches, he can start the Requip 1 mg 3 times a day. He is concerned that may be a low dose and I told him we can advance it fairly quickly but we have started low and workup. The patches worked very well, but he just cannot afford them. I asked him to continue his Artane is 3 times a day because I am not sure that is not causing some of his problems when he was taking 5 a day, with ataxia and more unsteadiness. Donna Echevarria He does have dyskinesias from too much medication when he overuses it. He sometimes increases his Sinemet to 1-1/2 tablets 5 times a day. He thought Mirapex made him sick. His back is gotten somewhat better with physical therapy. He is limited in his finances and I am not sure he will be able to afford Rytary. He has had no clear focal weakness, and only migratory sensory loss in his leg. He is still able to work some, and states that he thinks he might be getting slowly better and wants to wait before any diagnostic testing done.  His bowel and bladder functions normal and he has no radicular symptoms into the left leg. He has mild dyskinesia but is moving well today. He most likely had end of dose dystonia. Patient has a 16 year history of Parkinson's disease, and a problem with neck pain and tightness in his neck and headaches in the posterior head regions. He seems to show restrictive range of motion particularly to the right on exam suggesting degenerative arthritis of his neck, and cervical spine x-rays do show some mild degenerative changes at C5-6. He is tolerating the medication well, and has not noticed any side effects, but I can see he is having some mild dyskinesia with the medicines which he says does not bother him. He's had no new weakness or sensory loss visual changes cognitive issues or gait problems. He is taking his vitamins regularly and we did advise him he needs to do that and his vitamin D. He could not afford Azilect in the past but he now has insurance and he may be restarted on it at our next visit if he needs it. Today he looks stable His complete review of systems and symptoms was negative for any other new medical problems, complications or illnesses. He has a history of Parkinson's disease, kidney stones, neck pain and cervical spondylosis and history of vein stripping. Past Medical History:  
Diagnosis Date  Kidney disease  Movement disorder  Nephrolithiasis  Neurological disorder  Other ill-defined conditions(799.89) Parkinson disease  Parkinson disease ([facility] [address] Past Surgical History:  
Procedure Laterality Date  HX VEIN STRIPPING Family History Problem Relation Age of Onset  Hypertension Mother  Heart Disease Mother  Parkinsonism Father  Arthritis-osteo Father  Seizures Sister Social History Substance Use Topics  Smoking status: Never Smoker  Smokeless tobacco: Never Used  Alcohol use Yes Comment: rare Current Outpatient Prescriptions Medication Sig Dispense Refill  selegiline (ELDYPRYL) 5 mg capsule Take 1 Cap by mouth two (2) times a day. 60 Cap 5  carbidopa-levodopa (SINEMET)  mg per tablet TAKE 1.5 TABLETS BY MOUTH 5 TIMES DAILY 675 Tab 0  
 trihexyphenidyl (ARTANE) 2 mg tablet Take 1 Tab by mouth three (3) times daily. 360 Tab 1  
 multivitamins-minerals-lutein (CENTRUM SILVER) Tab Take  by mouth.  Cholecalciferol, Vitamin D3, (VITAMIN D3) 1,000 unit cap Take  by mouth. No Known Allergies Review of Systems: A comprehensive review of systems was negative except for: Neurological: positive for coordination problems, gait problems and tremor Objective: I 
 
 
NEUROLOGICAL EXAM: 
 
Appearance: The patient is well developed, well nourished, provides a coherent history and is in no acute distress. Mental Status: Oriented to time, place and person and the president, cognitive function and fund of knowledge seemed normal though he is a little slow mentally. Shraddha Jason Speech is fluent without aphasia or dysarthria. Mood and affect appropriate, but anxious and depressed. Cranial Nerves:   Intact visual fields. Fundi are benign. JUAN M, EOM's full, no nystagmus, no ptosis. Facial sensation is normal. Corneal reflexes are not tested. Facial movement is symmetric, but the patient has mild bilateral facial dyskinesias. Hearing is normal bilaterally. Palate is midline with normal sternocleidomastoid and trapezius muscles are normal. Tongue is midline. Neck without meningismus or bruits Temporal arteries are not tender or enlarged Motor:  5/5 strength in upper and lower proximal and distal muscles. Normal bulk and mild increased tone with cogwheel rigidity and bradykinesia in both upper extremities right side slightly greater than the left. No fasciculations. Patient has mild dyskinesias of the face and extremities bilaterally.  Patient has mild decreased range of motion of the neck on exam particularly to the right, with some tenderness of the cervical muscles. Reflexes:   Deep tendon reflexes 2+/4 and symmetrical. No Babinski or clonus present. Patient has negative straight-leg raising test in the sitting position bilaterally Patient has mild percussion tenderness over the lower lumbar spine Patient can almost bend over and touch his toes Sensory:   Normal to touch, pinprick and vibration and DSS. Gait:  Abnormal gait for slight dyskinesia in his arms and legs and slightly decreased arm swing in his right upper extremity. Tremor:   No tremor noted. Cerebellar:   Mildly abnormal Romberg and tandem cerebellar signs present. Neurovascular:  Normal heart sounds and regular rhythm, peripheral pulses decreased in his feet, and no carotid bruits. Assessment:  
 
 
 
Plan:  
 
Should with increasing problems of being able to afford his medications, we will DC the Neupro patch, and once he is done with that, restart his Requip 1 mg 3 times a day and advance his dose up as tolerated We will give him Eldepryl 5 mg twice a day because he wants to retry that again because his father said he is doing better with that. Parkinson's disease with increasing morning end of dose dystonia, Because of his increasing ataxia and unsteady gait we tapered him on Artane he will continue his dose down to 3 times a day now Cervical spondylosis with muscle contraction headaches Lumbar radiculopathy into the right leg probably secondary to degenerative disc disease Patient is to make sure he does his exercises for cervical spondylosis and lumbar spinal stenosis Patient is to continue his neck exercises, and advised to take anti-inflammatories as needed and continue a regular exercise program. 
Patient is to continue his current dose of other medications. He's doing a little worse with his gait and dyskinesias He is to remain mentally and physically active and start taking vitamins and vitamin D more regularly and try to exercise more. He'll be seen again in 6 months time or earlier if needed Time of visit was 35 minutes today, counseling the patient, going over his medications, discussing therapeutic options with him in detail. Signed By: Anthony Ordonez MD   
 February 12, 2018 This note will not be viewable in [address] If you have questions, please do not hesitate to call me. I look forward to following Mr. Jay Philippe along with you. Sincerely, Anthony Ordonez MD

## 2018-02-12 NOTE — PATIENT INSTRUCTIONS
[facility]   Statement to Patients  April 1, 2014      In an effort to ensure the large volume of patient prescription refills is processed in the most efficient and expeditious manner, we are asking our patients to assist us by calling your Pharmacy for all prescription refills, this will include also your  Mail Order Pharmacy. The pharmacy will contact our office electronically to continue the refill process. Please do not wait until the last minute to call your pharmacy. We need at least 48 hours (2days) to fill prescriptions. We also encourage you to call your pharmacy before going to  your prescription to make sure it is ready. With regard to controlled substance prescription refill requests (narcotic refills) that need to be picked up at our office, we ask your cooperation by providing us with at least 72 hours (3days) notice that you will need a refill. We will not refill narcotic prescription refill requests after 4:00pm on any weekday, Monday through Thursday, or after 2:00pm on Fridays, or on the weekends. We encourage everyone to explore another way of getting your prescription refill request processed using [employer], our patient web portal through our electronic medical record system. [employer] is an efficient and effective way to communicate your medication request directly to the office and  downloadable as an ryan on your smart phone . [employer] also features a review functionality that allows you to view your medication list as well as leave messages for your physician. Are you ready to get connected? If so please review the attatched instructions or speak to any of our staff to get you set up right away! Thank you so much for your cooperation. Should you have any questions please contact our Practice Administrator.     The Physicians and Staff,  [facility]          A Healthy Lifestyle: Care Instructions  Your Care Instructions    A healthy lifestyle can help you feel good, stay at a healthy weight, and have plenty of energy for both work and play. A healthy lifestyle is something you can share with your whole family. A healthy lifestyle also can lower your risk for serious health problems, such as high blood pressure, heart disease, and diabetes. You can follow a few steps listed below to improve your health and the health of your family. Follow-up care is a key part of your treatment and safety. Be sure to make and go to all appointments, and call your doctor if you are having problems. It's also a good idea to know your test results and keep a list of the medicines you take. How can you care for yourself at home? · Do not eat too much sugar, fat, or fast foods. You can still have dessert and treats now and then. The goal is moderation. · Start small to improve your eating habits. Pay attention to portion sizes, drink less juice and soda pop, and eat more fruits and vegetables. ¨ Eat a healthy amount of food. A 3-ounce serving of meat, for example, is about the size of a deck of cards. Fill the rest of your plate with vegetables and whole grains. ¨ Limit the amount of soda and sports drinks you have every day. Drink more water when you are thirsty. ¨ Eat at least 5 servings of fruits and vegetables every day. It may seem like a lot, but it is not hard to reach this goal. A serving or helping is 1 piece of fruit, 1 cup of vegetables, or 2 cups of leafy, raw vegetables. Have an apple or some carrot sticks as an afternoon snack instead of a candy bar. Try to have fruits and/or vegetables at every meal.  · Make exercise part of your daily routine. You may want to start with simple activities, such as walking, bicycling, or slow swimming. Try to be active 30 to 60 minutes every day. You do not need to do all 30 to 60 minutes all at once. For example, you can exercise 3 times a day for 10 or 20 minutes.  Moderate exercise is safe for most people, but it is always a good idea to talk to your doctor before starting an exercise program.  · Keep moving. Jeevan Gomez the lawn, work in the garden, or [employer]. Take the stairs instead of the elevator at work. · If you smoke, quit. People who smoke have an increased risk for heart attack, stroke, cancer, and other lung illnesses. Quitting is hard, but there are ways to boost your chance of quitting tobacco for good. ¨ Use nicotine gum, patches, or lozenges. ¨ Ask your doctor about stop-smoking programs and medicines. ¨ Keep trying. In addition to reducing your risk of diseases in the future, you will notice some benefits soon after you stop using tobacco. If you have shortness of breath or asthma symptoms, they will likely get better within a few weeks after you quit. · Limit how much alcohol you drink. Moderate amounts of alcohol (up to 2 drinks a day for men, 1 drink a day for women) are okay. But drinking too much can lead to liver problems, high blood pressure, and other health problems. Family health  If you have a family, there are many things you can do together to improve your health. · Eat meals together as a family as often as possible. · Eat healthy foods. This includes fruits, vegetables, lean meats and dairy, and whole grains. · Include your family in your fitness plan. Most people think of activities such as jogging or tennis as the way to fitness, but there are many ways you and your family can be more active. Anything that makes you breathe hard and gets your heart pumping is exercise. Here are some tips:  ¨ Walk to do errands or to take your child to school or the bus. ¨ Go for a family bike ride after dinner instead of watching TV. Where can you learn more? Go to http://von-joanne.info/. Enter I394 in the search box to learn more about \"A Healthy Lifestyle: Care Instructions. \"  Current as of:  May 12, 2017  Content Version: 11.4  © 0754-5956 [employer]. Care instructions adapted under license by [employer] (which disclaims liability or warranty for this information). If you have questions about a medical condition or this instruction, always ask your healthcare professional. [place] [address] any warranty or liability for your use of this information.

## 2018-02-12 NOTE — MR AVS SNAPSHOT
[address], 
[address], [address] [facility] 
[phone] Patient: Deb Rivera MRN: [id number] :1965 Visit Information Date & Time Provider Department Dept. Phone Encounter #  
 2018  1:40 PM Ant Saldana MD Neurology [facility] at [facility] [phone] [id number] Follow-up Instructions Return in about 6 months (around 2018). Upcoming Health Maintenance Date Due DTaP/Tdap/Td series (1 - Tdap) 3/2/1988 FOBT Q 1 YEAR AGE 50-75 3/2/2017 Influenza Age 5 to Adult 2017 Allergies as of 2018  Review Complete On: 2018 By: Ant Saldana MD  
 No Known Allergies Current Immunizations  Never Reviewed No immunizations on file. Not reviewed this visit You Were Diagnosed With   
  
 Codes Comments Dyskinesia due to Parkinson's disease ([facility]    -  Primary ICD-10-CM: G24.9, G20 
ICD-9-CM: 781.3, 332.0 Lumbar back pain with radiculopathy affecting left lower extremity     ICD-10-CM: M54.17 ICD-9-CM: 724.4 Parkinson's disease ([facility]     ICD-10-CM: G20 
ICD-9-CM: 332.0 Vitals BP Pulse Temp Resp Height(growth percentile) Weight(growth percentile) 98/74 67 98 °F (36.7 °C) 16 6' 2\" (1.88 m) 262 lb (118.8 kg) SpO2 BMI Smoking Status 98% 33.64 kg/m2 Never Smoker Vitals History BMI and BSA Data Body Mass Index Body Surface Area  
 33.64 kg/m 2 2.49 m 2 Preferred Pharmacy Pharmacy Name Phone [phone] - [address], [address] AT [facility] [phone] Your Updated Medication List  
  
   
This list is accurate as of: 18  2:19 PM.  Always use your most recent med list.  
  
  
  
  
 carbidopa-levodopa  mg per tablet Commonly known as:  SINEMET  
TAKE 1.5 TABLETS BY MOUTH 5 TIMES DAILY CENTRUM SILVER Tab tablet Generic drug:  multivitamins-minerals-lutein Take  by mouth. selegiline 5 mg capsule Commonly known as:  ELDYPRYL Take 1 Cap by mouth two (2) times a day. trihexyphenidyl 2 mg tablet Commonly known as:  ARTANE Take 1 Tab by mouth three (3) times daily. VITAMIN D3 1,000 unit Cap Generic drug:  cholecalciferol Take  by mouth. Prescriptions Sent to Pharmacy Refills  
 selegiline (ELDYPRYL) 5 mg capsule 5 Sig: Take 1 Cap by mouth two (2) times a day. Class: Normal  
 [facility] [address] [place] [address] #: [phone] Route: Oral  
  
Follow-up Instructions Return in about 6 months (around 8/12/2018). Patient Instructions PRESCRIPTION REFILL POLICY [facility] Statement to Patients April 1, 2014 In an effort to ensure the large volume of patient prescription refills is processed in the most efficient and expeditious manner, we are asking our patients to assist us by calling your Pharmacy for all prescription refills, this will include also your  Mail Order Pharmacy. The pharmacy will contact our office electronically to continue the refill process. Please do not wait until the last minute to call your pharmacy. We need at least 48 hours (2days) to fill prescriptions. We also encourage you to call your pharmacy before going to  your prescription to make sure it is ready. With regard to controlled substance prescription refill requests (narcotic refills) that need to be picked up at our office, we ask your cooperation by providing us with at least 72 hours (3days) notice that you will need a refill. We will not refill narcotic prescription refill requests after 4:00pm on any weekday, Monday through Thursday, or after 2:00pm on Fridays, or on the weekends. We encourage everyone to explore another way of getting your prescription refill request processed using OpenExchange, our patient web portal through our electronic medical record system. OpenExchange is an efficient and effective way to communicate your medication request directly to the office and  downloadable as an ryan on your smart phone . OpenExchange also features a review functionality that allows you to view your medication list as well as leave messages for your physician. Are you ready to get connected? If so please review the attatched instructions or speak to any of our staff to get you set up right away! Thank you so much for your cooperation. Should you have any questions please contact our Practice Administrator. The Physicians and Staff,  [facility] A Healthy Lifestyle: Care Instructions Your Care Instructions A healthy lifestyle can help you feel good, stay at a healthy weight, and have plenty of energy for both work and play. A healthy lifestyle is something you can share with your whole family. A healthy lifestyle also can lower your risk for serious health problems, such as high blood pressure, heart disease, and diabetes. You can follow a few steps listed below to improve your health and the health of your family. Follow-up care is a key part of your treatment and safety. Be sure to make and go to all appointments, and call your doctor if you are having problems. It's also a good idea to know your test results and keep a list of the medicines you take. How can you care for yourself at home? · Do not eat too much sugar, fat, or fast foods. You can still have dessert and treats now and then. The goal is moderation. · Start small to improve your eating habits. Pay attention to portion sizes, drink less juice and soda pop, and eat more fruits and vegetables. ¨ Eat a healthy amount of food.  A 3-ounce serving of meat, for example, is about the size of a deck of cards. Fill the rest of your plate with vegetables and whole grains. ¨ Limit the amount of soda and sports drinks you have every day. Drink more water when you are thirsty. ¨ Eat at least 5 servings of fruits and vegetables every day. It may seem like a lot, but it is not hard to reach this goal. A serving or helping is 1 piece of fruit, 1 cup of vegetables, or 2 cups of leafy, raw vegetables. Have an apple or some carrot sticks as an afternoon snack instead of a candy bar. Try to have fruits and/or vegetables at every meal. 
· Make exercise part of your daily routine. You may want to start with simple activities, such as walking, bicycling, or slow swimming. Try to be active 30 to 60 minutes every day. You do not need to do all 30 to 60 minutes all at once. For example, you can exercise 3 times a day for 10 or 20 minutes. Moderate exercise is safe for most people, but it is always a good idea to talk to your doctor before starting an exercise program. 
· Keep moving. Linda Harpin the lawn, work in the garden, or [employer]. Take the stairs instead of the elevator at work. · If you smoke, quit. People who smoke have an increased risk for heart attack, stroke, cancer, and other lung illnesses. Quitting is hard, but there are ways to boost your chance of quitting tobacco for good. ¨ Use nicotine gum, patches, or lozenges. ¨ Ask your doctor about stop-smoking programs and medicines. ¨ Keep trying. In addition to reducing your risk of diseases in the future, you will notice some benefits soon after you stop using tobacco. If you have shortness of breath or asthma symptoms, they will likely get better within a few weeks after you quit. · Limit how much alcohol you drink. Moderate amounts of alcohol (up to 2 drinks a day for men, 1 drink a day for women) are okay. But drinking too much can lead to liver problems, high blood pressure, and other health problems. Family health If you have a family, there are many things you can do together to improve your health. · Eat meals together as a family as often as possible. · Eat healthy foods. This includes fruits, vegetables, lean meats and dairy, and whole grains. · Include your family in your fitness plan. Most people think of activities such as jogging or tennis as the way to fitness, but there are many ways you and your family can be more active. Anything that makes you breathe hard and gets your heart pumping is exercise. Here are some tips: 
¨ Walk to do errands or to take your child to school or the bus. ¨ Go for a family bike ride after dinner instead of watching TV. Where can you learn more? Go to http://von-joanne.info/. Enter P224 in the search box to learn more about \"A Healthy Lifestyle: Care Instructions. \" Current as of: May 12, 2017 Content Version: 11.4 © 4423-0188 [employer]. Care instructions adapted under license by [employer] (which disclaims liability or warranty for this information). If you have questions about a medical condition or this instruction, always ask your healthcare professional. [place] [address] any warranty or liability for your use of this information. Introducing [facility]! Luis Willingham introduces [employer] Sl[employer] patient portal. Now you can access parts of your medical record, email your doctor's office, and request medication refills online. 1. In your internet browser, go to [email or web address] 2. Click on the First Time User? Click Here link in the Sign In box. You will see the New Member Sign Up page. 3. Enter your [employer] Access Code exactly as it appears below. You will not need to use this code after youve completed the sign-up process. If you do not sign up before the expiration date, you must request a new code. · PR Slides Access Code: [email or web address] Expires: 5/13/2018  1:48 PM 
 
 4. Enter the last four digits of your Social Security Number (xxxx) and Date of Birth (mm/dd/yyyy) as indicated and click Submit. You will be taken to the next sign-up page. 5. Create a [employer] ID. This will be your [employer] login ID and cannot be changed, so think of one that is secure and easy to remember. 6. Create a [employer] password. You can change your password at any time. 7. Enter your Password Reset Question and Answer. This can be used at a later time if you forget your password. 8. Enter your e-mail address. You will receive e-mail notification when new information is available in [address] 9. Click Sign Up. You can now view and download portions of your medical record. 10. Click the Download Summary menu link to download a portable copy of your medical information. If you have questions, please visit the Frequently Asked Questions section of the [employer] website. Remember, [employer] is NOT to be used for urgent needs. For medical emergencies, dial 911. Now available from your iPhone and Android! Please provide this summary of care documentation to your next provider. Your primary care clinician is listed as Kathy Lucero. If you have any questions after today's visit, please call [phone].

## 2018-02-13 NOTE — PROGRESS NOTES
Consult    Subjective:     Myriam Nguyen is a 50 y. o.right-handed  male seen today for evaluation of New problem of wanting to switch his medications to Eldepryl, because his dad is doing better on that medication, and for increasing difficulty trying to afford his medications, and does not think he can afford the Neupro patches anymore at the request of Dr. Earnest Lebron, and wanted to switch back to Requip. We told the patient to go ahead and finish off his Neupro patches, and we gave her new prescription for the Eldepryl 5 mg twice a day at breakfast and lunch, and once he has finished his patches, he can start the Requip 1 mg 3 times a day. He is concerned that may be a low dose and I told him we can advance it fairly quickly but we have started low and workup. The patches worked very well, but he just cannot afford them. I asked him to continue his Artane is 3 times a day because I am not sure that is not causing some of his problems when he was taking 5 a day, with ataxia and more unsteadiness. Stormy Ruiz He does have dyskinesias from too much medication when he overuses it. He sometimes increases his Sinemet to 1-1/2 tablets 5 times a day. He thought Mirapex made him sick. His back is gotten somewhat better with physical therapy. He is limited in his finances and I am not sure he will be able to afford Rytary. He has had no clear focal weakness, and only migratory sensory loss in his leg. He is still able to work some, and states that he thinks he might be getting slowly better and wants to wait before any diagnostic testing done. His bowel and bladder functions normal and he has no radicular symptoms into the left leg. He has mild dyskinesia but is moving well today. He most likely had end of dose dystonia. Patient has a 16 year history of Parkinson's disease, and a problem with neck pain and tightness in his neck and headaches in the posterior head regions.   He seems to show restrictive range of motion particularly to the right on exam suggesting degenerative arthritis of his neck, and cervical spine x-rays do show some mild degenerative changes at C5-6. He is tolerating the medication well, and has not noticed any side effects, but I can see he is having some mild dyskinesia with the medicines which he says does not bother him. He's had no new weakness or sensory loss visual changes cognitive issues or gait problems. He is taking his vitamins regularly and we did advise him he needs to do that and his vitamin D. He could not afford Azilect in the past but he now has insurance and he may be restarted on it at our next visit if he needs it. Today he looks stable     His complete review of systems and symptoms was negative for any other new medical problems, complications or illnesses. He has a history of Parkinson's disease, kidney stones, neck pain and cervical spondylosis and history of vein stripping. Past Medical History:   Diagnosis Date    Kidney disease     Movement disorder     Nephrolithiasis     Neurological disorder     Other ill-defined conditions(799.89)     Parkinson disease    Parkinson disease ([facility]       Past Surgical History:   Procedure Laterality Date    HX VEIN STRIPPING       Family History   Problem Relation Age of Onset    Hypertension Mother     Heart Disease Mother     Parkinsonism Father     Arthritis-osteo Father     Seizures Sister       Social History   Substance Use Topics    Smoking status: Never Smoker    Smokeless tobacco: Never Used    Alcohol use Yes      Comment: rare       Current Outpatient Prescriptions   Medication Sig Dispense Refill    selegiline (ELDYPRYL) 5 mg capsule Take 1 Cap by mouth two (2) times a day. 60 Cap 5    carbidopa-levodopa (SINEMET)  mg per tablet TAKE 1.5 TABLETS BY MOUTH 5 TIMES DAILY 675 Tab 0    trihexyphenidyl (ARTANE) 2 mg tablet Take 1 Tab by mouth three (3) times daily.  360 Tab 1    multivitamins-minerals-lutein (CENTRUM SILVER) Tab Take  by mouth.  Cholecalciferol, Vitamin D3, (VITAMIN D3) 1,000 unit cap Take  by mouth. No Known Allergies     Review of Systems:  A comprehensive review of systems was negative except for: Neurological: positive for coordination problems, gait problems and tremor     Objective:     I      NEUROLOGICAL EXAM:    Appearance: The patient is well developed, well nourished, provides a coherent history and is in no acute distress. Mental Status: Oriented to time, place and person and the president, cognitive function and fund of knowledge seemed normal though he is a little slow mentally. Arvid Ze Speech is fluent without aphasia or dysarthria. Mood and affect appropriate, but anxious and depressed. Cranial Nerves:   Intact visual fields. Fundi are benign. JUAN M, EOM's full, no nystagmus, no ptosis. Facial sensation is normal. Corneal reflexes are not tested. Facial movement is symmetric, but the patient has mild bilateral facial dyskinesias. Hearing is normal bilaterally. Palate is midline with normal sternocleidomastoid and trapezius muscles are normal. Tongue is midline. Neck without meningismus or bruits  Temporal arteries are not tender or enlarged   Motor:  5/5 strength in upper and lower proximal and distal muscles. Normal bulk and mild increased tone with cogwheel rigidity and bradykinesia in both upper extremities right side slightly greater than the left. No fasciculations. Patient has mild dyskinesias of the face and extremities bilaterally. Patient has mild decreased range of motion of the neck on exam particularly to the right, with some tenderness of the cervical muscles. Reflexes:   Deep tendon reflexes 2+/4 and symmetrical. No Babinski or clonus present.   Patient has negative straight-leg raising test in the sitting position bilaterally  Patient has mild percussion tenderness over the lower lumbar spine  Patient can almost bend over and touch his toes   Sensory: Normal to touch, pinprick and vibration and DSS. Gait:  Abnormal gait for slight dyskinesia in his arms and legs and slightly decreased arm swing in his right upper extremity. Tremor:   No tremor noted. Cerebellar:   Mildly abnormal Romberg and tandem cerebellar signs present. Neurovascular:  Normal heart sounds and regular rhythm, peripheral pulses decreased in his feet, and no carotid bruits. Assessment:         Plan:     Should with increasing problems of being able to afford his medications, we will DC the Neupro patch, and once he is done with that, restart his Requip 1 mg 3 times a day and advance his dose up as tolerated  We will give him Eldepryl 5 mg twice a day because he wants to retry that again because his father said he is doing better with that. Parkinson's disease with increasing morning end of dose dystonia,   Because of his increasing ataxia and unsteady gait we tapered him on Artane he will continue his dose down to 3 times a day now  Cervical spondylosis with muscle contraction headaches  Lumbar radiculopathy into the right leg probably secondary to degenerative disc disease  Patient is to make sure he does his exercises for cervical spondylosis and lumbar spinal stenosis  Patient is to continue his neck exercises, and advised to take anti-inflammatories as needed and continue a regular exercise program.  Patient is to continue his current dose of other medications. He's doing a little worse with his gait and dyskinesias  He is to remain mentally and physically active and start taking vitamins and vitamin D more regularly and try to exercise more. He'll be seen again in 6 months time or earlier if needed  Time of visit was 35 minutes today, counseling the patient, going over his medications, discussing therapeutic options with him in detail. Signed By: Marquise Dominguez MD     February 12, 2018       This note will not be viewable in [address]

## 2018-02-21 DIAGNOSIS — G20 PARKINSON'S DISEASE (HCC): ICD-10-CM

## 2018-02-21 RX ORDER — CARBIDOPA AND LEVODOPA 25; 100 MG/1; MG/1
TABLET ORAL
Qty: 675 TAB | Refills: 0 | Status: SHIPPED | OUTPATIENT
Start: 2018-02-21 | End: 2018-07-06 | Stop reason: SDUPTHER

## 2018-03-06 ENCOUNTER — TELEPHONE (OUTPATIENT)
Dept: NEUROLOGY | Age: 53
End: 2018-03-06

## 2018-03-06 NOTE — TELEPHONE ENCOUNTER
----- Message from Jazzmine Yanes sent at 3/6/2018 11:24 AM EST -----  Regarding: Dr Bryan/telephone  Pt (p) [phone], would like a return call back from his nurse  On  questions regarding his medication.

## 2018-03-07 NOTE — TELEPHONE ENCOUNTER
I called the patient and at his office visit he was stopped on neupro patch once he was done, but couldn't remember what and how he was to change to. I advised that per the office note he was to restart his requip at 3 times per day.     I asked him to give a follow up phone call in about a week for an update

## 2018-05-14 ENCOUNTER — TELEPHONE (OUTPATIENT)
Dept: NEUROLOGY | Age: 53
End: 2018-05-14

## 2018-05-14 NOTE — TELEPHONE ENCOUNTER
----- Message from Gómez Later sent at 5/14/2018  3:13 PM EDT -----  Regarding: Dr. Anu Joiner would like a call back regarding his medication. Pt was told to call and request to speak to the nurse regarding the dosage of one of his medications and pt has some questions about his medications he would like to discuss. Pt can be reached at [phone].

## 2018-05-15 NOTE — TELEPHONE ENCOUNTER
Patient went back to [facility] and now only has 2-3 pills left of the 1mg ones. Taking it 3 times daily. He seems to be a bit confused on his medications and thinks some medication is causing his legs to jump. I put the patient on the schedule for tomorrow to discuss in length.   Will confirm in the morning that the referral is still good for the patient

## 2018-05-16 ENCOUNTER — OFFICE VISIT (OUTPATIENT)
Dept: NEUROLOGY | Age: 53
End: 2018-05-16

## 2018-05-16 VITALS
OXYGEN SATURATION: 94 % | DIASTOLIC BLOOD PRESSURE: 60 MMHG | HEIGHT: 74 IN | WEIGHT: 258 LBS | TEMPERATURE: 98 F | SYSTOLIC BLOOD PRESSURE: 104 MMHG | BODY MASS INDEX: 33.11 KG/M2 | RESPIRATION RATE: 16 BRPM | HEART RATE: 60 BPM

## 2018-05-16 DIAGNOSIS — M54.40 BACK PAIN OF LUMBAR REGION WITH SCIATICA: ICD-10-CM

## 2018-05-16 DIAGNOSIS — G20 PARKINSON'S DISEASE (HCC): ICD-10-CM

## 2018-05-16 DIAGNOSIS — M54.2 NECK PAIN: ICD-10-CM

## 2018-05-16 DIAGNOSIS — G20 DYSKINESIA DUE TO PARKINSON'S DISEASE (HCC): Primary | ICD-10-CM

## 2018-05-16 DIAGNOSIS — M54.16 LUMBAR BACK PAIN WITH RADICULOPATHY AFFECTING LEFT LOWER EXTREMITY: ICD-10-CM

## 2018-05-16 DIAGNOSIS — M47.812 CERVICAL SPONDYLOSIS WITHOUT MYELOPATHY: ICD-10-CM

## 2018-05-16 DIAGNOSIS — G24.9 DYSKINESIA DUE TO PARKINSON'S DISEASE (HCC): Primary | ICD-10-CM

## 2018-05-16 RX ORDER — ROPINIROLE 1 MG/1
1 TABLET, FILM COATED ORAL 3 TIMES DAILY
Qty: 100 TAB | Refills: 11 | Status: SHIPPED | OUTPATIENT
Start: 2018-05-16 | End: 2018-09-07 | Stop reason: SDUPTHER

## 2018-05-16 NOTE — PROGRESS NOTES
Consult    Subjective:     Daphnie aEson is a 48 y. o.right-handed  male seen today for evaluation at the request of Dr. Chanelle Tillman of New problem of persistent dyskinesias in his right leg that occurs every time he takes his medication since starting the medication of Eldepryl 5 mg twice a day, he does not find that the Eldepryl has provided him with any clinical benefit and his Parkinson's symptoms either. We told him to discontinue the Eldepryl, and we will start him on Gocovri the new extended release amantadine for dyskinesias in Parkinson's patients. Patient will continue his Requip 1 mg 3 times a day and his Sinemet one and 1 and 1/2f tablets 5 times a day. Patient was given information to read on the new medication, and a starter form was filled out for the patient in the office today. I believe the patient has tried amantadine in the past for his dyskinesias which caused some side effect I think. Patient could not afford the Neupro patches anymore and wanted to switch back to Requip and new prescription sent in for patient for that. The patches worked very well, but he just cannot afford them. I asked him to continue his Artane at bid each day. He sometimes increases his Sinemet to 1-1/2 tablets 5 times a day. He thought Mirapex made him sick. His back is gotten somewhat better with physical therapy. He is limited in his finances and I am not sure he will be able to afford Rytary. He has had no clear focal weakness, and only migratory sensory loss in his leg. He is still able to work some, and states that he thinks he might be getting slowly better and wants to wait before any diagnostic testing done. His bowel and bladder functions normal and he has no radicular symptoms into the left leg. He has mild dyskinesia but is moving well today. He most likely had end of dose dystonia.  Patient has a 16 year history of Parkinson's disease, and a problem with neck pain and tightness in his neck and headaches in the posterior head regions. He seems to show restrictive range of motion particularly to the right on exam suggesting degenerative arthritis of his neck, and cervical spine x-rays do show some mild degenerative changes at C5-6. He is tolerating the medication well, and has not noticed any side effects, but I can see he is having some mild dyskinesia with the medicines which he says does not bother him. He's had no new weakness or sensory loss visual changes cognitive issues or gait problems. He is taking his vitamins regularly and we did advise him he needs to do that and his vitamin D. He could not afford Azilect in the past but he now has insurance and he may be restarted on it at our next visit if he needs it. Today he looks stable     His complete review of systems and symptoms was negative for any other new medical problems, complications or illnesses. He has a history of Parkinson's disease, kidney stones, neck pain and cervical spondylosis and history of vein stripping. Past Medical History:   Diagnosis Date    Kidney disease     Movement disorder     Nephrolithiasis     Neurological disorder     Other ill-defined conditions(799.89)     Parkinson disease    Parkinson disease ([address]       Past Surgical History:   Procedure Laterality Date    HX VEIN STRIPPING       Family History   Problem Relation Age of Onset    Hypertension Mother     Heart Disease Mother     Parkinsonism Father     Arthritis-osteo Father     Seizures Sister       Social History   Substance Use Topics    Smoking status: Never Smoker    Smokeless tobacco: Never Used    Alcohol use Yes      Comment: rare       Current Outpatient Prescriptions   Medication Sig Dispense Refill    rOPINIRole (REQUIP) 1 mg tablet Take 1 Tab by mouth three (3) times daily. 100 Tab 11    amantadine HCl (GOCOVRI) 137 mg cp24 Take 2 Caps by mouth nightly.  60 Cap 11    carbidopa-levodopa (SINEMET)  mg per tablet TAKE 1.5 TABLETS BY MOUTH 5 TIMES DAILY 675 Tab 0    trihexyphenidyl (ARTANE) 2 mg tablet Take 1 Tab by mouth three (3) times daily. 360 Tab 1    multivitamins-minerals-lutein (CENTRUM SILVER) Tab Take  by mouth.  Cholecalciferol, Vitamin D3, (VITAMIN D3) 1,000 unit cap Take  by mouth. No Known Allergies     Review of Systems:  A comprehensive review of systems was negative except for: Neurological: positive for coordination problems, gait problems and tremor     Objective:     I      NEUROLOGICAL EXAM:    Appearance: The patient is well developed, well nourished, provides a coherent history and is in no acute distress. Patient had persistent dyskinesia in his right lower extremity during the early part of the exam but then stopped before the exam is over. Mental Status: Oriented to time, place and person and the president, cognitive function and fund of knowledge seemed normal though he is a little slow mentally. [place] Speech is fluent without aphasia or dysarthria. Mood and affect appropriate, but anxious and depressed. Cranial Nerves:   Intact visual fields. Fundi are benign. JUAN M, EOM's full, no nystagmus, no ptosis. Facial sensation is normal. Corneal reflexes are not tested. Facial movement is symmetric, but the patient has mild bilateral facial dyskinesias. Hearing is normal bilaterally. Palate is midline with normal sternocleidomastoid and trapezius muscles are normal. Tongue is midline. Neck without meningismus or bruits  Temporal arteries are not tender or enlarged   Motor:  5/5 strength in upper and lower proximal and distal muscles. Normal bulk and mild increased tone with cogwheel rigidity and bradykinesia in both upper extremities right side slightly greater than the left. No fasciculations. Patient has mild dyskinesias of the face and extremities bilaterally.  Patient has mild decreased range of motion of the neck on exam particularly to the right, with some tenderness of the cervical muscles. Reflexes:   Deep tendon reflexes 2+/4 and symmetrical. No Babinski or clonus present. Patient has negative straight-leg raising test in the sitting position bilaterally  Patient has mild percussion tenderness over the lower lumbar spine  Patient can almost bend over and touch his toes   Sensory:   Normal to touch, pinprick and vibration and DSS. Gait:  Abnormal gait for slight dyskinesia in his arms and legs and slightly decreased arm swing in his right upper extremity. Tremor:   No tremor noted. Cerebellar:   Mildly abnormal Romberg and tandem cerebellar signs present. Neurovascular:  Normal heart sounds and regular rhythm, peripheral pulses decreased in his feet, and no carotid bruits. Assessment:         Plan:     Patient with new problem of persistent dyskinesias in his right lower extremity, will DC the Eldepryl, and start Gailen Hammed for the patient today to help with his Parkinson's and dyskinesias  Side effects of the medications were explained to the patient including drowsiness, confusion, agitation, or any side effect is to call us immediately. Patient with increasing problems of being able to afford his medications, we will DC the Neupro patch, and once he is done with that, restart his Requip 1 mg 3 times a day and advance his dose up as tolerated  Parkinson's disease with increasing morning end of dose dystonia,   Because of his increasing ataxia and unsteady gait we tapered him on Artane he will continue his dose down to 2 times a day now  Cervical spondylosis with muscle contraction headaches  Lumbar radiculopathy into the right leg probably secondary to degenerative disc disease  Patient is to make sure he does his exercises for cervical spondylosis and lumbar spinal stenosis  Patient is to continue his neck exercises, and advised to take anti-inflammatories as needed and continue a regular exercise program.  Patient is to continue his current dose of other medications. He's doing a little worse with his gait and dyskinesias  He is to remain mentally and physically active and start taking vitamins and vitamin D more regularly and try to exercise more. He'll be seen again in 6 months time or earlier if needed  Time of visit was 35 minutes today, counseling the patient, going over his medications, discussing therapeutic options with him in detail. Signed By: Izzy Henry MD     May 16, 2018       This note will not be viewable in [address]

## 2018-05-16 NOTE — LETTER
5/16/2018 3:59 PM 
 
Patient:  Mary Carmen Calles YOB: 1965 Date of Visit: 5/16/2018 Dear No Recipients: Thank you for referring Mr. Joaquin Neves to me for evaluation/treatment. Below are the relevant portions of my assessment and plan of care. Consult Subjective:  
 
Mary Carmen Calles is a 48 y. o.right-handed  male seen today for evaluation at the request of Dr. Barbie Núñez of New problem of persistent dyskinesias in his right leg that occurs every time he takes his medication since starting the medication of Eldepryl 5 mg twice a day, he does not find that the Eldepryl has provided him with any clinical benefit and his Parkinson's symptoms either. We told him to discontinue the Eldepryl, and we will start him on Gocovri the new extended release amantadine for dyskinesias in Parkinson's patients. Patient will continue his Requip 1 mg 3 times a day and his Sinemet one and 1 and 1/2f tablets 5 times a day. Patient was given information to read on the new medication, and a starter form was filled out for the patient in the office today. I believe the patient has tried amantadine in the past for his dyskinesias which caused some side effect I think. Patient could not afford the Neupro patches anymore and wanted to switch back to Requip and new prescription sent in for patient for that. The patches worked very well, but he just cannot afford them. I asked him to continue his Artane at bid each day. He sometimes increases his Sinemet to 1-1/2 tablets 5 times a day. He thought Mirapex made him sick. His back is gotten somewhat better with physical therapy. He is limited in his finances and I am not sure he will be able to afford Rytary. He has had no clear focal weakness, and only migratory sensory loss in his leg.  He is still able to work some, and states that he thinks he might be getting slowly better and wants to wait before any diagnostic testing done. His bowel and bladder functions normal and he has no radicular symptoms into the left leg. He has mild dyskinesia but is moving well today. He most likely had end of dose dystonia. Patient has a 16 year history of Parkinson's disease, and a problem with neck pain and tightness in his neck and headaches in the posterior head regions. He seems to show restrictive range of motion particularly to the right on exam suggesting degenerative arthritis of his neck, and cervical spine x-rays do show some mild degenerative changes at C5-6. He is tolerating the medication well, and has not noticed any side effects, but I can see he is having some mild dyskinesia with the medicines which he says does not bother him. He's had no new weakness or sensory loss visual changes cognitive issues or gait problems. He is taking his vitamins regularly and we did advise him he needs to do that and his vitamin D. He could not afford Azilect in the past but he now has insurance and he may be restarted on it at our next visit if he needs it. Today he looks stable His complete review of systems and symptoms was negative for any other new medical problems, complications or illnesses. He has a history of Parkinson's disease, kidney stones, neck pain and cervical spondylosis and history of vein stripping. Past Medical History:  
Diagnosis Date  Kidney disease  Movement disorder  Nephrolithiasis  Neurological disorder  Other ill-defined conditions(799.89) Parkinson disease  Parkinson disease ([facility] [address] Past Surgical History:  
Procedure Laterality Date  HX VEIN STRIPPING Family History Problem Relation Age of Onset  Hypertension Mother  Heart Disease Mother  Parkinsonism Father  Arthritis-osteo Father  Seizures Sister Social History Substance Use Topics  Smoking status: Never Smoker  Smokeless tobacco: Never Used  Alcohol use Yes Comment: rare Current Outpatient Prescriptions Medication Sig Dispense Refill  rOPINIRole (REQUIP) 1 mg tablet Take 1 Tab by mouth three (3) times daily. 100 Tab 11  
 amantadine HCl (GOCOVRI) 137 mg cp24 Take 2 Caps by mouth nightly. 60 Cap 11  
 carbidopa-levodopa (SINEMET)  mg per tablet TAKE 1.5 TABLETS BY MOUTH 5 TIMES DAILY 675 Tab 0  
 trihexyphenidyl (ARTANE) 2 mg tablet Take 1 Tab by mouth three (3) times daily. 360 Tab 1  
 multivitamins-minerals-lutein (CENTRUM SILVER) Tab Take  by mouth.  Cholecalciferol, Vitamin D3, (VITAMIN D3) 1,000 unit cap Take  by mouth. No Known Allergies Review of Systems: A comprehensive review of systems was negative except for: Neurological: positive for coordination problems, gait problems and tremor Objective: I 
 
 
NEUROLOGICAL EXAM: 
 
Appearance: The patient is well developed, well nourished, provides a coherent history and is in no acute distress. Patient had persistent dyskinesia in his right lower extremity during the early part of the exam but then stopped before the exam is over. Mental Status: Oriented to time, place and person and the president, cognitive function and fund of knowledge seemed normal though he is a little slow mentally. [place] Speech is fluent without aphasia or dysarthria. Mood and affect appropriate, but anxious and depressed. Cranial Nerves:   Intact visual fields. Fundi are benign. JUAN M, EOM's full, no nystagmus, no ptosis. Facial sensation is normal. Corneal reflexes are not tested. Facial movement is symmetric, but the patient has mild bilateral facial dyskinesias. Hearing is normal bilaterally. Palate is midline with normal sternocleidomastoid and trapezius muscles are normal. Tongue is midline. Neck without meningismus or bruits Temporal arteries are not tender or enlarged Motor:  5/5 strength in upper and lower proximal and distal muscles. Normal bulk and mild increased tone with cogwheel rigidity and bradykinesia in both upper extremities right side slightly greater than the left. No fasciculations. Patient has mild dyskinesias of the face and extremities bilaterally. Patient has mild decreased range of motion of the neck on exam particularly to the right, with some tenderness of the cervical muscles. Reflexes:   Deep tendon reflexes 2+/4 and symmetrical. No Babinski or clonus present. Patient has negative straight-leg raising test in the sitting position bilaterally Patient has mild percussion tenderness over the lower lumbar spine Patient can almost bend over and touch his toes Sensory:   Normal to touch, pinprick and vibration and DSS. Gait:  Abnormal gait for slight dyskinesia in his arms and legs and slightly decreased arm swing in his right upper extremity. Tremor:   No tremor noted. Cerebellar:   Mildly abnormal Romberg and tandem cerebellar signs present. Neurovascular:  Normal heart sounds and regular rhythm, peripheral pulses decreased in his feet, and no carotid bruits. Assessment:  
 
 
 
Plan:  
 
Patient with new problem of persistent dyskinesias in his right lower extremity, will DC the Eldepryl, and start Gocovri for the patient today to help with his Parkinson's and dyskinesias Side effects of the medications were explained to the patient including drowsiness, confusion, agitation, or any side effect is to call us immediately. Patient with increasing problems of being able to afford his medications, we will DC the Neupro patch, and once he is done with that, restart his Requip 1 mg 3 times a day and advance his dose up as tolerated Parkinson's disease with increasing morning end of dose dystonia, Because of his increasing ataxia and unsteady gait we tapered him on Artane he will continue his dose down to 2 times a day now Cervical spondylosis with muscle contraction headaches Lumbar radiculopathy into the right leg probably secondary to degenerative disc disease Patient is to make sure he does his exercises for cervical spondylosis and lumbar spinal stenosis Patient is to continue his neck exercises, and advised to take anti-inflammatories as needed and continue a regular exercise program. 
Patient is to continue his current dose of other medications. He's doing a little worse with his gait and dyskinesias He is to remain mentally and physically active and start taking vitamins and vitamin D more regularly and try to exercise more. He'll be seen again in 6 months time or earlier if needed Time of visit was 35 minutes today, counseling the patient, going over his medications, discussing therapeutic options with him in detail. Signed By: Heriberto Goldmann, MD   
 May 16, 2018 This note will not be viewable in [address] If you have questions, please do not hesitate to call me. I look forward to following Mr. Ni Mai along with you. Sincerely, Heriberto Goldmann, MD

## 2018-05-16 NOTE — MR AVS SNAPSHOT
[address], 
[id number], [address] [facility] 
[phone] Patient: Juan Carter MRN: [id number] :1965 Visit Information Date & Time Provider Department Dept. Phone Encounter #  
 2018  3:00 PM Grace Morataya MD [facility] [phone] [id number] Follow-up Instructions Return in about 6 months (around 2018). Your Appointments 2018  2:20 PM  
Follow Up with Grace Morataya MD  
[facility] [address]) Appt Note: Follow up $0CP tdb 18  
 [address] [place] [address], 
[address], [address] [address]  
[address], [address] [place] [address], [address] [address] Upcoming Health Maintenance Date Due DTaP/Tdap/Td series (1 - Tdap) 3/2/1988 FOBT Q 1 YEAR AGE 50-75 3/2/2017 MEDICARE YEARLY EXAM 3/14/2018 Influenza Age 5 to Adult 2018 Allergies as of 2018  Review Complete On: 2018 By: Grace Morataya MD  
 No Known Allergies Current Immunizations  Never Reviewed No immunizations on file. Not reviewed this visit You Were Diagnosed With   
  
 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 
ICD-9-CM: 781.3, 332.0 Lumbar back pain with radiculopathy affecting left lower extremity     ICD-10-CM: M54.17 ICD-9-CM: 724.4 Parkinson's disease ([facility]     ICD-10-CM: G20 
ICD-9-CM: 332.0 Cervical spondylosis without myelopathy     ICD-10-CM: Q00.064 ICD-9-CM: 721.0 Neck pain     ICD-10-CM: M54.2 ICD-9-CM: 723.1 Back pain of lumbar region with sciatica     ICD-10-CM: M54.40 ICD-9-CM: 724.2, 724.3 Vitals BP Pulse Temp Resp Height(growth percentile) Weight(growth percentile) 104/60 60 98 °F (36.7 °C) 16 6' 2\" (1.88 m) 258 lb (117 kg) SpO2 BMI Smoking Status 94% 33.13 kg/m2 Never Smoker Vitals History BMI and BSA Data Body Mass Index Body Surface Area  
 33.13 kg/m 2 2.47 m 2 Preferred Pharmacy Pharmacy Name Phone [facility] [phone] , [address] [place] [address] AT [place] [address] [phone] Your Updated Medication List  
  
   
This list is accurate as of 5/16/18  3:41 PM.  Always use your most recent med list.  
  
  
  
  
 amantadine HCl 137 mg Cp24 Commonly known as:  Celina Coleman Take 2 Caps by mouth nightly. carbidopa-levodopa  mg per tablet Commonly known as:  SINEMET  
TAKE 1.5 TABLETS BY MOUTH 5 TIMES DAILY CENTRUM SILVER Tab tablet Generic drug:  multivitamins-minerals-lutein Take  by mouth. rOPINIRole 1 mg tablet Commonly known as:  Milus Law Take 1 Tab by mouth three (3) times daily. trihexyphenidyl 2 mg tablet Commonly known as:  ARTANE Take 1 Tab by mouth three (3) times daily. VITAMIN D3 1,000 unit Cap Generic drug:  cholecalciferol Take  by mouth. Prescriptions Sent to Pharmacy Refills  
 rOPINIRole (REQUIP) 1 mg tablet 11 Sig: Take 1 Tab by mouth three (3) times daily. Class: Normal  
 Pharmacy: [facility] [address] [place] [address] #: [phone] Route: Oral  
  
Follow-up Instructions Return in about 6 months (around 11/16/2018). Patient Instructions Office Policies 
 
o Phone calls/patient messages: Please allow up to 24 hours for someone in the office to contact you about your call or message. Be mindful your provider may be out of the office or your message may require further review. We encourage you to use Tiltap for your messages as this is a faster, more efficient way to communicate with our office 
 
o Medication Refills: Prescription medications require up to 48 business hours to process. We encourage you to use [employer] for your refills. For controlled medications: Please allow up to 72 business hours to process. Certain medications may require you to  a written prescription at our office. NO narcotic/controlled medications will be prescribed after 4pm Monday through Friday or on weekends 
 
o Form/Paperwork Completion: 
Please note there is a $25 fee for all paperwork completed by our providers. We ask that you allow 7-14 business days. Pre-payment is due prior to picking up/faxing the completed form. You may also download your forms to [employer] to have your doctor print off. A Healthy Lifestyle: Care Instructions Your Care Instructions A healthy lifestyle can help you feel good, stay at a healthy weight, and have plenty of energy for both work and play. A healthy lifestyle is something you can share with your whole family. A healthy lifestyle also can lower your risk for serious health problems, such as high blood pressure, heart disease, and diabetes. You can follow a few steps listed below to improve your health and the health of your family. Follow-up care is a key part of your treatment and safety. Be sure to make and go to all appointments, and call your doctor if you are having problems. It's also a good idea to know your test results and keep a list of the medicines you take. How can you care for yourself at home? · Do not eat too much sugar, fat, or fast foods. You can still have dessert and treats now and then. The goal is moderation. · Start small to improve your eating habits. Pay attention to portion sizes, drink less juice and soda pop, and eat more fruits and vegetables. ¨ Eat a healthy amount of food. A 3-ounce serving of meat, for example, is about the size of a deck of cards. Fill the rest of your plate with vegetables and whole grains. ¨ Limit the amount of soda and sports drinks you have every day. Drink more water when you are thirsty. ¨ Eat at least 5 servings of fruits and vegetables every day. It may seem like a lot, but it is not hard to reach this goal. A serving or helping is 1 piece of fruit, 1 cup of vegetables, or 2 cups of leafy, raw vegetables. Have an apple or some carrot sticks as an afternoon snack instead of a candy bar. Try to have fruits and/or vegetables at every meal. 
· Make exercise part of your daily routine. You may want to start with simple activities, such as walking, bicycling, or slow swimming. Try to be active 30 to 60 minutes every day. You do not need to do all 30 to 60 minutes all at once. For example, you can exercise 3 times a day for 10 or 20 minutes. Moderate exercise is safe for most people, but it is always a good idea to talk to your doctor before starting an exercise program. 
· Keep moving. Harmony Dine the lawn, work in the garden, or Bitly. Take the stairs instead of the elevator at work. · If you smoke, quit. People who smoke have an increased risk for heart attack, stroke, cancer, and other lung illnesses. Quitting is hard, but there are ways to boost your chance of quitting tobacco for good. ¨ Use nicotine gum, patches, or lozenges. ¨ Ask your doctor about stop-smoking programs and medicines. ¨ Keep trying. In addition to reducing your risk of diseases in the future, you will notice some benefits soon after you stop using tobacco. If you have shortness of breath or asthma symptoms, they will likely get better within a few weeks after you quit. · Limit how much alcohol you drink. Moderate amounts of alcohol (up to 2 drinks a day for men, 1 drink a day for women) are okay. But drinking too much can lead to liver problems, high blood pressure, and other health problems. Family health If you have a family, there are many things you can do together to improve your health. · Eat meals together as a family as often as possible. · Eat healthy foods. This includes fruits, vegetables, lean meats and dairy, and whole grains. · Include your family in your fitness plan. Most people think of activities such as jogging or tennis as the way to fitness, but there are many ways you and your family can be more active. Anything that makes you breathe hard and gets your heart pumping is exercise. Here are some tips: 
¨ Walk to do errands or to take your child to school or the bus. ¨ Go for a family bike ride after dinner instead of watching TV. Where can you learn more? Go to http://vonCambrooke Foodsjoanne.info/. Enter G909 in the search box to learn more about \"A Healthy Lifestyle: Care Instructions. \" Current as of: May 12, 2017 Content Version: 11.4 © 2877-3332 EnglishUp. Care instructions adapted under license by [employer] (which disclaims liability or warranty for this information). If you have questions about a medical condition or this instruction, always ask your healthcare professional. [address] any warranty or liability for your use of this information. Introducing [facility]! Art Naranjo introduces [employer] patient portal. Now you can access parts of your medical record, email your doctor's office, and request medication refills online. 1. In your internet browser, go to [email or web address] [email or web address]. [email or web address] Discovery 2. Click on the First Time User? Click Here link in the Sign In box. You will see the New Member Sign Up page. 3. Enter your [employer] Access Code exactly as it appears below. You will not need to use this code after youve completed the sign-up process. If you do not sign up before the expiration date, you must request a new code. · [employer] Access Code: [email or web address] Expires: 8/14/2018  3:22 PM 
 
4.  Enter the last four digits of your Social Security Number (xxxx) and Date of Birth (mm/dd/yyyy) as indicated and click Submit. You will be taken to the next sign-up page. 5. Create a Glassbeam ID. This will be your Glassbeam login ID and cannot be changed, so think of one that is secure and easy to remember. 6. Create a Glassbeam password. You can change your password at any time. 7. Enter your Password Reset Question and Answer. This can be used at a later time if you forget your password. 8. Enter your e-mail address. You will receive e-mail notification when new information is available in [address] 9. Click Sign Up. You can now view and download portions of your medical record. 10. Click the Download Summary menu link to download a portable copy of your medical information. If you have questions, please visit the Frequently Asked Questions section of the Glass[employer] website. Remember, Glass[employer] is NOT to be used for urgent needs. For medical emergencies, dial 911. Now available from your iPhone and Android! Please provide this summary of care documentation to your next provider. Your primary care clinician is listed as Rosalie Hutton. If you have any questions after today's visit, please call [phone].

## 2018-05-16 NOTE — PATIENT INSTRUCTIONS
Office Policies    o Phone calls/patient messages:  Please allow up to 24 hours for someone in the office to contact you about your call or message. Be mindful your provider may be out of the office or your message may require further review. We encourage you to use "[employer] for your messages as this is a faster, more efficient way to communicate with our office    o Medication Refills:  Prescription medications require up to 48 business hours to process. We encourage you to use "[employer] for your refills. For controlled medications: Please allow up to 72 business hours to process. Certain medications may require you to  a written prescription at our office. NO narcotic/controlled medications will be prescribed after 4pm Monday through Friday or on weekends    o Form/Paperwork Completion:  Please note there is a $25 fee for all paperwork completed by our providers. We ask that you allow 7-14 business days. Pre-payment is due prior to picking up/faxing the completed form. You may also download your forms to "[employer] to have your doctor print off. A Healthy Lifestyle: Care Instructions  Your Care Instructions    A healthy lifestyle can help you feel good, stay at a healthy weight, and have plenty of energy for both work and play. A healthy lifestyle is something you can share with your whole family. A healthy lifestyle also can lower your risk for serious health problems, such as high blood pressure, heart disease, and diabetes. You can follow a few steps listed below to improve your health and the health of your family. Follow-up care is a key part of your treatment and safety. Be sure to make and go to all appointments, and call your doctor if you are having problems. It's also a good idea to know your test results and keep a list of the medicines you take. How can you care for yourself at home? · Do not eat too much sugar, fat, or fast foods. You can still have dessert and treats now and then. The goal is moderation. · Start small to improve your eating habits. Pay attention to portion sizes, drink less juice and soda pop, and eat more fruits and vegetables. ¨ Eat a healthy amount of food. A 3-ounce serving of meat, for example, is about the size of a deck of cards. Fill the rest of your plate with vegetables and whole grains. ¨ Limit the amount of soda and sports drinks you have every day. Drink more water when you are thirsty. ¨ Eat at least 5 servings of fruits and vegetables every day. It may seem like a lot, but it is not hard to reach this goal. A serving or helping is 1 piece of fruit, 1 cup of vegetables, or 2 cups of leafy, raw vegetables. Have an apple or some carrot sticks as an afternoon snack instead of a candy bar. Try to have fruits and/or vegetables at every meal.  · Make exercise part of your daily routine. You may want to start with simple activities, such as walking, bicycling, or slow swimming. Try to be active 30 to 60 minutes every day. You do not need to do all 30 to 60 minutes all at once. For example, you can exercise 3 times a day for 10 or 20 minutes. Moderate exercise is safe for most people, but it is always a good idea to talk to your doctor before starting an exercise program.  · Keep moving. Meraz Boys the lawn, work in the garden, or [place]. Take the stairs instead of the elevator at work. · If you smoke, quit. People who smoke have an increased risk for heart attack, stroke, cancer, and other lung illnesses. Quitting is hard, but there are ways to boost your chance of quitting tobacco for good. ¨ Use nicotine gum, patches, or lozenges. ¨ Ask your doctor about stop-smoking programs and medicines. ¨ Keep trying. In addition to reducing your risk of diseases in the future, you will notice some benefits soon after you stop using tobacco. If you have shortness of breath or asthma symptoms, they will likely get better within a few weeks after you quit.   · Limit how much alcohol you drink. Moderate amounts of alcohol (up to 2 drinks a day for men, 1 drink a day for women) are okay. But drinking too much can lead to liver problems, high blood pressure, and other health problems. Family health  If you have a family, there are many things you can do together to improve your health. · Eat meals together as a family as often as possible. · Eat healthy foods. This includes fruits, vegetables, lean meats and dairy, and whole grains. · Include your family in your fitness plan. Most people think of activities such as jogging or tennis as the way to fitness, but there are many ways you and your family can be more active. Anything that makes you breathe hard and gets your heart pumping is exercise. Here are some tips:  ¨ Walk to do errands or to take your child to school or the bus. ¨ Go for a family bike ride after dinner instead of watching TV. Where can you learn more? Go to http://von-joanne.info/. Enter P138 in the search box to learn more about \"A Healthy Lifestyle: Care Instructions. \"  Current as of: May 12, 2017  Content Version: 11.4  © 3095-8831 Healthwise, Incorporated. Care instructions adapted under license by [employer] (which disclaims liability or warranty for this information). If you have questions about a medical condition or this instruction, always ask your healthcare professional. [address] any warranty or liability for your use of this information.

## 2018-05-21 ENCOUNTER — TELEPHONE (OUTPATIENT)
Dept: NEUROLOGY | Age: 53
End: 2018-05-21

## 2018-05-21 NOTE — TELEPHONE ENCOUNTER
----- Message from Tyrel Ornelas sent at 5/21/2018  9:57 AM EDT -----  Regarding: Dr. Govind Whitt([facility]) requested a form completed that was faxed on 05/17/18 for pt's  Rx(\"GoCovri\")(fax)[phone] . Best contact number  [phone].

## 2018-05-22 ENCOUNTER — TELEPHONE (OUTPATIENT)
Dept: NEUROLOGY | Age: 53
End: 2018-05-22

## 2018-05-22 NOTE — TELEPHONE ENCOUNTER
Re:  Ethan CEVALLOS - sent via [facility] JOHANNA w/ copy of 5/16/18 office notes to [facility].

## 2018-07-06 ENCOUNTER — TELEPHONE (OUTPATIENT)
Dept: NEUROLOGY | Age: 53
End: 2018-07-06

## 2018-07-06 DIAGNOSIS — M47.812 CERVICAL SPONDYLOSIS WITHOUT MYELOPATHY: ICD-10-CM

## 2018-07-06 DIAGNOSIS — M54.2 NECK PAIN: ICD-10-CM

## 2018-07-06 DIAGNOSIS — M54.16 LUMBAR BACK PAIN WITH RADICULOPATHY AFFECTING LEFT LOWER EXTREMITY: ICD-10-CM

## 2018-07-06 DIAGNOSIS — G20 PARKINSON'S DISEASE (HCC): ICD-10-CM

## 2018-07-06 DIAGNOSIS — G24.9 DYSKINESIA DUE TO PARKINSON'S DISEASE (HCC): ICD-10-CM

## 2018-07-06 DIAGNOSIS — M54.40 BACK PAIN OF LUMBAR REGION WITH SCIATICA: ICD-10-CM

## 2018-07-06 DIAGNOSIS — G20 DYSKINESIA DUE TO PARKINSON'S DISEASE (HCC): ICD-10-CM

## 2018-07-06 RX ORDER — CARBIDOPA AND LEVODOPA 25; 100 MG/1; MG/1
TABLET ORAL
Qty: 675 TAB | Refills: 0 | Status: SHIPPED | OUTPATIENT
Start: 2018-07-06 | End: 2018-09-07 | Stop reason: SDUPTHER

## 2018-07-06 NOTE — TELEPHONE ENCOUNTER
Patient called to request a refill on his \"Gocovri\"    He would like the rx faxed to [phone]. He would also like a call when it has been sent.     [phone]

## 2018-07-06 NOTE — TELEPHONE ENCOUNTER
Should be a mail order pj.  I asked the patient to call me back with the phone number so that I can get this taken care of for him

## 2018-07-09 DIAGNOSIS — G20 DYSKINESIA DUE TO PARKINSON'S DISEASE (HCC): ICD-10-CM

## 2018-07-09 DIAGNOSIS — M47.812 CERVICAL SPONDYLOSIS WITHOUT MYELOPATHY: ICD-10-CM

## 2018-07-09 DIAGNOSIS — M54.2 NECK PAIN: ICD-10-CM

## 2018-07-09 DIAGNOSIS — M54.16 LUMBAR BACK PAIN WITH RADICULOPATHY AFFECTING LEFT LOWER EXTREMITY: ICD-10-CM

## 2018-07-09 DIAGNOSIS — G24.9 DYSKINESIA DUE TO PARKINSON'S DISEASE (HCC): ICD-10-CM

## 2018-07-09 DIAGNOSIS — G20 PARKINSON'S DISEASE (HCC): ICD-10-CM

## 2018-07-09 DIAGNOSIS — M54.40 BACK PAIN OF LUMBAR REGION WITH SCIATICA: ICD-10-CM

## 2018-07-09 NOTE — TELEPHONE ENCOUNTER
Future Appointments  Date Time Provider Maira Baigi   9/7/2018 2:20 PM Jolanta Dunlap MD [address]                         Last Appointment My Department:  5/16/2018    Please advise of refill below. Needs to go to specialty pharmacy for patient that is in the chart  Requested Prescriptions     Pending Prescriptions Disp Refills    amantadine HCl (GOCOVRI) 137 mg cp24 60 Cap 3     Sig: Take 2 Caps by mouth nightly.

## 2018-07-09 NOTE — TELEPHONE ENCOUNTER
----- Message from Carlos Enrique Arias sent at 7/9/2018 10:28 AM EDT -----  Regarding: /Refill   Pt stated that he was returning call in reference to his medication Gocovri. Pt stated that he used [address] [place] [address] [id number] and the number is  [phone]. Pt contact number is [phone].

## 2018-09-06 DIAGNOSIS — G24.9 DYSKINESIA DUE TO PARKINSON'S DISEASE (HCC): ICD-10-CM

## 2018-09-06 DIAGNOSIS — M54.16 LUMBAR BACK PAIN WITH RADICULOPATHY AFFECTING LEFT LOWER EXTREMITY: ICD-10-CM

## 2018-09-06 DIAGNOSIS — M54.40 BACK PAIN OF LUMBAR REGION WITH SCIATICA: ICD-10-CM

## 2018-09-06 DIAGNOSIS — M54.2 NECK PAIN: ICD-10-CM

## 2018-09-06 DIAGNOSIS — M47.812 CERVICAL SPONDYLOSIS WITHOUT MYELOPATHY: ICD-10-CM

## 2018-09-06 DIAGNOSIS — G20 PARKINSON'S DISEASE (HCC): ICD-10-CM

## 2018-09-06 DIAGNOSIS — G20 DYSKINESIA DUE TO PARKINSON'S DISEASE (HCC): ICD-10-CM

## 2018-09-06 RX ORDER — TRIHEXYPHENIDYL HYDROCHLORIDE 2 MG/1
TABLET ORAL
Qty: 360 TAB | Refills: 0 | Status: SHIPPED | OUTPATIENT
Start: 2018-09-06 | End: 2018-09-07 | Stop reason: SDUPTHER

## 2018-09-07 ENCOUNTER — OFFICE VISIT (OUTPATIENT)
Dept: NEUROLOGY | Age: 53
End: 2018-09-07

## 2018-09-07 VITALS
DIASTOLIC BLOOD PRESSURE: 62 MMHG | SYSTOLIC BLOOD PRESSURE: 104 MMHG | HEIGHT: 74 IN | BODY MASS INDEX: 32.21 KG/M2 | HEART RATE: 80 BPM | OXYGEN SATURATION: 95 % | WEIGHT: 251 LBS

## 2018-09-07 DIAGNOSIS — M54.16 LUMBAR BACK PAIN WITH RADICULOPATHY AFFECTING LEFT LOWER EXTREMITY: ICD-10-CM

## 2018-09-07 DIAGNOSIS — M54.2 NECK PAIN: ICD-10-CM

## 2018-09-07 DIAGNOSIS — G20 DYSKINESIA DUE TO PARKINSON'S DISEASE (HCC): ICD-10-CM

## 2018-09-07 DIAGNOSIS — G24.9 DYSKINESIA DUE TO PARKINSON'S DISEASE (HCC): ICD-10-CM

## 2018-09-07 DIAGNOSIS — G20 PARKINSON'S DISEASE (HCC): Primary | ICD-10-CM

## 2018-09-07 DIAGNOSIS — M47.812 CERVICAL SPONDYLOSIS WITHOUT MYELOPATHY: ICD-10-CM

## 2018-09-07 DIAGNOSIS — M54.40 BACK PAIN OF LUMBAR REGION WITH SCIATICA: ICD-10-CM

## 2018-09-07 RX ORDER — ROPINIROLE 1 MG/1
1 TABLET, FILM COATED ORAL 3 TIMES DAILY
Qty: 100 TAB | Refills: 11 | Status: SHIPPED | OUTPATIENT
Start: 2018-09-07 | End: 2019-04-30 | Stop reason: SDUPTHER

## 2018-09-07 RX ORDER — CARBIDOPA AND LEVODOPA 25; 100 MG/1; MG/1
TABLET ORAL
Qty: 675 TAB | Refills: 5 | Status: SHIPPED | OUTPATIENT
Start: 2018-09-07 | End: 2019-04-30 | Stop reason: SDUPTHER

## 2018-09-07 RX ORDER — TRIHEXYPHENIDYL HYDROCHLORIDE 2 MG/1
TABLET ORAL
Qty: 360 TAB | Refills: 3 | Status: SHIPPED | OUTPATIENT
Start: 2018-09-07 | End: 2018-09-12 | Stop reason: ALTCHOICE

## 2018-09-07 NOTE — PATIENT INSTRUCTIONS
Office Policies 
 
o Phone calls/patient messages: Please allow up to 24 hours for someone in the office to contact you about your call or message. Be mindful your provider may be out of the office or your message may require further review. We encourage you to use [employer] for your messages as this is a faster, more efficient way to communicate with our office 
 
o Medication Refills: 
Prescription medications require up to 48 business hours to process. We encourage you to use [employer] for your refills. For controlled medications: Please allow up to 72 business hours to process. Certain medications may require you to  a written prescription at our office. NO narcotic/controlled medications will be prescribed after 4pm Monday through Friday or on weekends 
 
o Form/Paperwork Completion: 
Please note there is a $25 fee for all paperwork completed by our providers. We ask that you allow 7-14 business days. Pre-payment is due prior to picking up/faxing the completed form. You may also download your forms to [employer] to have your doctor print off. A Healthy Lifestyle: Care Instructions Your Care Instructions A healthy lifestyle can help you feel good, stay at a healthy weight, and have plenty of energy for both work and play. A healthy lifestyle is something you can share with your whole family. A healthy lifestyle also can lower your risk for serious health problems, such as high blood pressure, heart disease, and diabetes. You can follow a few steps listed below to improve your health and the health of your family. Follow-up care is a key part of your treatment and safety. Be sure to make and go to all appointments, and call your doctor if you are having problems. It's also a good idea to know your test results and keep a list of the medicines you take. How can you care for yourself at home? · Do not eat too much sugar, fat, or fast foods.  You can still have dessert and treats now and then. The goal is moderation. · Start small to improve your eating habits. Pay attention to portion sizes, drink less juice and soda pop, and eat more fruits and vegetables. ¨ Eat a healthy amount of food. A 3-ounce serving of meat, for example, is about the size of a deck of cards. Fill the rest of your plate with vegetables and whole grains. ¨ Limit the amount of soda and sports drinks you have every day. Drink more water when you are thirsty. ¨ Eat at least 5 servings of fruits and vegetables every day. It may seem like a lot, but it is not hard to reach this goal. A serving or helping is 1 piece of fruit, 1 cup of vegetables, or 2 cups of leafy, raw vegetables. Have an apple or some carrot sticks as an afternoon snack instead of a candy bar. Try to have fruits and/or vegetables at every meal. 
· Make exercise part of your daily routine. You may want to start with simple activities, such as walking, bicycling, or slow swimming. Try to be active 30 to 60 minutes every day. You do not need to do all 30 to 60 minutes all at once. For example, you can exercise 3 times a day for 10 or 20 minutes. Moderate exercise is safe for most people, but it is always a good idea to talk to your doctor before starting an exercise program. 
· Keep moving. Adi Mercedesthers the lawn, work in the garden, or Amedrix. Take the stairs instead of the elevator at work. · If you smoke, quit. People who smoke have an increased risk for heart attack, stroke, cancer, and other lung illnesses. Quitting is hard, but there are ways to boost your chance of quitting tobacco for good. ¨ Use nicotine gum, patches, or lozenges. ¨ Ask your doctor about stop-smoking programs and medicines. ¨ Keep trying.  
In addition to reducing your risk of diseases in the future, you will notice some benefits soon after you stop using tobacco. If you have shortness of breath or asthma symptoms, they will likely get better within a few weeks after you quit. · Limit how much alcohol you drink. Moderate amounts of alcohol (up to 2 drinks a day for men, 1 drink a day for women) are okay. But drinking too much can lead to liver problems, high blood pressure, and other health problems. Family health If you have a family, there are many things you can do together to improve your health. · Eat meals together as a family as often as possible. · Eat healthy foods. This includes fruits, vegetables, lean meats and dairy, and whole grains. · Include your family in your fitness plan. Most people think of activities such as jogging or tennis as the way to fitness, but there are many ways you and your family can be more active. Anything that makes you breathe hard and gets your heart pumping is exercise. Here are some tips: 
¨ Walk to do errands or to take your child to school or the bus. ¨ Go for a family bike ride after dinner instead of watching TV. Where can you learn more? Go to http://von-joanne.info/. Enter D546 in the search box to learn more about \"A Healthy Lifestyle: Care Instructions. \" Current as of: December 7, 2017 Content Version: 11.7 © 1993-2125 [employer]. Care instructions adapted under license by [employer] (which disclaims liability or warranty for this information). If you have questions about a medical condition or this instruction, always ask your healthcare professional. [place] [address] any warranty or liability for your use of this information.

## 2018-09-07 NOTE — PROGRESS NOTES
Consult Subjective:  
 
Asaf Rojo is a 48 y. o.right-handed  male seen today for evaluation at the request of Dr. Stephen Ozuna of [facility] of wanting to increase his medication, but still having persistent dyskinesias in his right leg that occurs every time he takes his medication, but better with his  Gocovri pills at night but still persistent and the patient questions whether not he can take more Requip or take an extra dose of medication, and I advised him that his dyskinesias will only worsen if he increases his medication as a sign of too much medication we had a talk to him at length to try to get him to understand this. He says sometimes he can hardly sleep because his leg jerks so bad. He has persistent dyskinesias even today in the office and his face and his leg. He had to stop the Eldepryl because it was too expensive. Patient will continue his Requip 1 mg 3 times a day and his Sinemet one and 1 and 1/2 tablets 5 times a day and his Artane 2 mg 3 times a day. Patient could not afford the Neupro patches anymore and wanted to switch back to Requip. The patches worked very well, but he just cannot afford them. I asked him to continue his Artane at bid each day but he says he needs it 3 times a day. . He sometimes increases his Sinemet to 1-1/2 tablets 5 times a day. He thought Mirapex made him sick. His back is gotten somewhat better with physical therapy. He is limited in his finances and I am not sure he will be able to afford Rytary. He has had no clear focal weakness, and only migratory sensory loss in his leg. He is still able to work some, and states that he thinks he might be getting slowly better and wants to wait before any diagnostic testing done. His bowel and bladder functions normal and he has no radicular symptoms into the left leg. He has mild dyskinesia but is moving well today.  He most likely had end of dose dystonia. Patient has a 16 year history of Parkinson's disease, and a problem with neck pain and tightness in his neck and headaches in the posterior head regions. He seems to show restrictive range of motion particularly to the right on exam suggesting degenerative arthritis of his neck, and cervical spine x-rays do show some mild degenerative changes at C5-6. He is tolerating the medication well, and has not noticed any side effects, but I can see he is having some mild dyskinesia with the medicines which he says does not bother him. He's had no new weakness or sensory loss visual changes cognitive issues or gait problems. He is taking his vitamins regularly and we did advise him he needs to do that and his vitamin D. He could not afford Azilect or the Neupro patch. His complete review of systems and symptoms was negative for any other new medical problems, complications or illnesses. He has a history of Parkinson's disease, kidney stones, neck pain and cervical spondylosis and history of vein stripping. Past Medical History:  
Diagnosis Date  Kidney disease  Movement disorder  Nephrolithiasis  Neurological disorder  Other ill-defined conditions(799.89) Parkinson disease  Parkinson disease ([facility] [address] Past Surgical History:  
Procedure Laterality Date  HX VEIN STRIPPING Family History Problem Relation Age of Onset  Hypertension Mother  Heart Disease Mother  Parkinsonism Father  Arthritis-osteo Father  Seizures Sister Social History Substance Use Topics  Smoking status: Never Smoker  Smokeless tobacco: Never Used  Alcohol use Yes Comment: rare Current Outpatient Prescriptions Medication Sig Dispense Refill  trihexyphenidyl (ARTANE) 2 mg tablet TAKE 1 TABLET BY MOUTH THREE TIMES DAILY 360 Tab 3  
 amantadine HCl (GOCOVRI) 137 mg cp24 Take 2 Caps by mouth nightly.  60 Cap 11  
  carbidopa-levodopa (SINEMET)  mg per tablet TAKE 1.5 TABLETS BY MOUTH 5 TIMES DAILY 675 Tab 5  
 rOPINIRole (REQUIP) 1 mg tablet Take 1 Tab by mouth three (3) times daily. 100 Tab 11  
 multivitamins-minerals-lutein (CENTRUM SILVER) Tab Take  by mouth.  Cholecalciferol, Vitamin D3, (VITAMIN D3) 1,000 unit cap Take  by mouth. No Known Allergies Review of Systems: A comprehensive review of systems was negative except for: Neurological: positive for coordination problems, gait problems and tremor Objective: I 
 
 
NEUROLOGICAL EXAM: 
 
Appearance: The patient is well developed, well nourished, provides a coherent history and is in no acute distress. Patient had persistent dyskinesia in his right lower extremity during the early part of the exam but then stopped before the exam is over. Mental Status: Oriented to time, place and person and the president, cognitive function and fund of knowledge seemed normal though he is a little slow mentally. Ethelene Gauss Speech is fluent without aphasia or dysarthria. Mood and affect appropriate, but anxious and depressed. Cranial Nerves:   Intact visual fields. Fundi are benign. JUAN M, EOM's full, no nystagmus, no ptosis. Facial sensation is normal. Corneal reflexes are not tested. Facial movement is symmetric, but the patient has mild bilateral facial dyskinesias. Hearing is normal bilaterally. Palate is midline with normal sternocleidomastoid and trapezius muscles are normal. Tongue is midline. Neck without meningismus or bruits Temporal arteries are not tender or enlarged Motor:  5/5 strength in upper and lower proximal and distal muscles. Normal bulk and mild increased tone with cogwheel rigidity and bradykinesia in both upper extremities right side slightly greater than the left. No fasciculations. Patient has mild dyskinesias of the face and extremities bilaterally particularly the right leg.  Patient has mild decreased range of motion of the neck on exam particularly to the right, with some tenderness of the cervical muscles. Reflexes:   Deep tendon reflexes 2+/4 and symmetrical. No Babinski or clonus present. Patient has negative straight-leg raising test in the sitting position bilaterally Patient has mild percussion tenderness over the lower lumbar spine Patient can almost bend over and touch his toes Sensory:   Normal to touch, pinprick and vibration and DSS. Gait:  Abnormal gait for slight dyskinesia in his arms and legs and slightly decreased arm swing in his right upper extremity. Tremor:   No tremor noted. Cerebellar:   Mildly abnormal Romberg and tandem cerebellar signs present. Neurovascular:  Normal heart sounds and regular rhythm, peripheral pulses decreased in his feet, and no carotid bruits. Assessment:  
 
 
 
Plan:  
 
Patient want to increase his medication the same time complaining bitterly about persistent dyskinesias and I told him he cannot do that, because it will only make his dyskinesias worse, and I spent a long time today 20 minutes of the 35 minute visit counseling the patient on all of this. Patient with new problem of persistent dyskinesias in his right lower extremity, will continue Gocovri for the patient today to help with his Parkinson's and dyskinesias which seems to help some Side effects of the medications were explained to the patient including drowsiness, confusion, agitation, or any side effect is to call us immediately. Patient with increasing problems of being able to afford his medications,  
Parkinson's disease with increasing morning end of dose dystonia, Because of his increasing ataxia and unsteady gait we tapered him on Artane he will continue his dose 3 times a day now Cervical spondylosis with muscle contraction headaches Lumbar radiculopathy into the right leg probably secondary to degenerative disc disease Patient is to make sure he does his exercises for cervical spondylosis and lumbar spinal stenosis Patient is to continue his neck exercises, and advised to take anti-inflammatories as needed and continue a regular exercise program. 
Patient is to continue his current dose of other medications. He's doing a little worse with his gait and dyskinesias He is to remain mentally and physically active and start taking vitamins and vitamin D more regularly and try to exercise more. He'll be seen again in 6 months time or earlier if needed Time of visit was 35 minutes today, counseling the patient, going over his medications, discussing therapeutic options with him in detail. Signed By: Darrion Cano MD   
 September 7, 2018 This note will not be viewable in [address]

## 2018-09-07 NOTE — LETTER
9/7/2018 6:40 PM 
 
Patient:  Osmani Altman YOB: 1965 Date of Visit: 9/7/2018 Dear No Recipients: Thank you for referring Mr. Jade Rendon to me for evaluation/treatment. Below are the relevant portions of my assessment and plan of care. Consult Subjective:  
 
Osmani Altman is a 48 y. o.right-handed  male seen today for evaluation at the request of Dr. Terry Adler of [facility] of wanting to increase his medication, but still having persistent dyskinesias in his right leg that occurs every time he takes his medication, but better with his  Gocovri pills at night but still persistent and the patient questions whether not he can take more Requip or take an extra dose of medication, and I advised him that his dyskinesias will only worsen if he increases his medication as a sign of too much medication we had a talk to him at length to try to get him to understand this. He says sometimes he can hardly sleep because his leg jerks so bad. He has persistent dyskinesias even today in the office and his face and his leg. He had to stop the Eldepryl because it was too expensive. Patient will continue his Requip 1 mg 3 times a day and his Sinemet one and 1 and 1/2 tablets 5 times a day and his Artane 2 mg 3 times a day. Patient could not afford the Neupro patches anymore and wanted to switch back to Requip. The patches worked very well, but he just cannot afford them. I asked him to continue his Artane at bid each day but he says he needs it 3 times a day. . He sometimes increases his Sinemet to 1-1/2 tablets 5 times a day. He thought Mirapex made him sick. His back is gotten somewhat better with physical therapy. He is limited in his finances and I am not sure he will be able to afford RytaEmploy[employer]. He has had no clear focal weakness, and only migratory sensory loss in his leg.  He is still able to work some, and states that he thinks he might be getting slowly better and wants to wait before any diagnostic testing done. His bowel and bladder functions normal and he has no radicular symptoms into the left leg. He has mild dyskinesia but is moving well today. He most likely had end of dose dystonia. Patient has a 16 year history of Parkinson's disease, and a problem with neck pain and tightness in his neck and headaches in the posterior head regions. He seems to show restrictive range of motion particularly to the right on exam suggesting degenerative arthritis of his neck, and cervical spine x-rays do show some mild degenerative changes at C5-6. He is tolerating the medication well, and has not noticed any side effects, but I can see he is having some mild dyskinesia with the medicines which he says does not bother him. He's had no new weakness or sensory loss visual changes cognitive issues or gait problems. He is taking his vitamins regularly and we did advise him he needs to do that and his vitamin D. He could not afford Azilect or the Neupro patch. His complete review of systems and symptoms was negative for any other new medical problems, complications or illnesses. He has a history of Parkinson's disease, kidney stones, neck pain and cervical spondylosis and history of vein stripping. Past Medical History:  
Diagnosis Date  Kidney disease  Movement disorder  Nephrolithiasis  Neurological disorder  Other ill-defined conditions(799.89) Parkinson disease  Parkinson disease ([facility] [address] Past Surgical History:  
Procedure Laterality Date  HX VEIN STRIPPING Family History Problem Relation Age of Onset  Hypertension Mother  Heart Disease Mother  Parkinsonism Father  Arthritis-osteo Father  Seizures Sister Social History Substance Use Topics  Smoking status: Never Smoker  Smokeless tobacco: Never Used  Alcohol use Yes Comment: rare Current Outpatient Prescriptions Medication Sig Dispense Refill  trihexyphenidyl (ARTANE) 2 mg tablet TAKE 1 TABLET BY MOUTH THREE TIMES DAILY 360 Tab 3  
 amantadine HCl (GOCOVRI) 137 mg cp24 Take 2 Caps by mouth nightly. 60 Cap 11  
 carbidopa-levodopa (SINEMET)  mg per tablet TAKE 1.5 TABLETS BY MOUTH 5 TIMES DAILY 675 Tab 5  
 rOPINIRole (REQUIP) 1 mg tablet Take 1 Tab by mouth three (3) times daily. 100 Tab 11  
 multivitamins-minerals-lutein (CENTRUM SILVER) Tab Take  by mouth.  Cholecalciferol, Vitamin D3, (VITAMIN D3) 1,000 unit cap Take  by mouth. No Known Allergies Review of Systems: A comprehensive review of systems was negative except for: Neurological: positive for coordination problems, gait problems and tremor Objective: I 
 
 
NEUROLOGICAL EXAM: 
 
Appearance: The patient is well developed, well nourished, provides a coherent history and is in no acute distress. Patient had persistent dyskinesia in his right lower extremity during the early part of the exam but then stopped before the exam is over. Mental Status: Oriented to time, place and person and the president, cognitive function and fund of knowledge seemed normal though he is a little slow mentally. [facility] Speech is fluent without aphasia or dysarthria. Mood and affect appropriate, but anxious and depressed. Cranial Nerves:   Intact visual fields. Fundi are benign. JUAN M, EOM's full, no nystagmus, no ptosis. Facial sensation is normal. Corneal reflexes are not tested. Facial movement is symmetric, but the patient has mild bilateral facial dyskinesias. Hearing is normal bilaterally. Palate is midline with normal sternocleidomastoid and trapezius muscles are normal. Tongue is midline. Neck without meningismus or bruits Temporal arteries are not tender or enlarged Motor:  5/5 strength in upper and lower proximal and distal muscles.  Normal bulk and mild increased tone with cogwheel rigidity and bradykinesia in both upper extremities right side slightly greater than the left. No fasciculations. Patient has mild dyskinesias of the face and extremities bilaterally particularly the right leg. Patient has mild decreased range of motion of the neck on exam particularly to the right, with some tenderness of the cervical muscles. Reflexes:   Deep tendon reflexes 2+/4 and symmetrical. No Babinski or clonus present. Patient has negative straight-leg raising test in the sitting position bilaterally Patient has mild percussion tenderness over the lower lumbar spine Patient can almost bend over and touch his toes Sensory:   Normal to touch, pinprick and vibration and DSS. Gait:  Abnormal gait for slight dyskinesia in his arms and legs and slightly decreased arm swing in his right upper extremity. Tremor:   No tremor noted. Cerebellar:   Mildly abnormal Romberg and tandem cerebellar signs present. Neurovascular:  Normal heart sounds and regular rhythm, peripheral pulses decreased in his feet, and no carotid bruits. Assessment:  
 
 
 
Plan:  
 
Patient want to increase his medication the same time complaining bitterly about persistent dyskinesias and I told him he cannot do that, because it will only make his dyskinesias worse, and I spent a long time today 20 minutes of the 35 minute visit counseling the patient on all of this. Patient with new problem of persistent dyskinesias in his right lower extremity, will continue Gocovri for the patient today to help with his Parkinson's and dyskinesias which seems to help some Side effects of the medications were explained to the patient including drowsiness, confusion, agitation, or any side effect is to call us immediately. Patient with increasing problems of being able to afford his medications,  
Parkinson's disease with increasing morning end of dose dystonia, Because of his increasing ataxia and unsteady gait we tapered him on Artane he will continue his dose 3 times a day now Cervical spondylosis with muscle contraction headaches Lumbar radiculopathy into the right leg probably secondary to degenerative disc disease Patient is to make sure he does his exercises for cervical spondylosis and lumbar spinal stenosis Patient is to continue his neck exercises, and advised to take anti-inflammatories as needed and continue a regular exercise program. 
Patient is to continue his current dose of other medications. He's doing a little worse with his gait and dyskinesias He is to remain mentally and physically active and start taking vitamins and vitamin D more regularly and try to exercise more. He'll be seen again in 6 months time or earlier if needed Time of visit was 35 minutes today, counseling the patient, going over his medications, discussing therapeutic options with him in detail. Signed By: Dirk Baptiste MD   
 September 7, 2018 This note will not be viewable in [address] If you have questions, please do not hesitate to call me. I look forward to following Mr. Shazia Lacy along with you. Sincerely, Dirk Baptiste MD

## 2018-09-07 NOTE — MR AVS SNAPSHOT
[address] [place] [address], 
[address], [address] [facility] 
[phone] Patient: Thera Favre MRN: [id number] :1965 Visit Information Date & Time Provider Department Dept. Phone Encounter #  
 2018  2:20 PM Abdi Sousa MD Neurology Clinic at [facility] [phone] [id number] Follow-up Instructions Return in about 6 months (around 3/7/2019). Upcoming Health Maintenance Date Due DTaP/Tdap/Td series (1 - Tdap) 3/2/1988 FOBT Q 1 YEAR AGE 50-75 3/2/2017 MEDICARE YEARLY EXAM 3/14/2018 Influenza Age 5 to Adult 2018 Allergies as of 2018  Review Complete On: 2018 By: Abdi Sousa MD  
 No Known Allergies Current Immunizations  Never Reviewed No immunizations on file. Not reviewed this visit You Were Diagnosed With   
  
 Codes Comments Parkinson's disease ([facility] [address]    -  Primary ICD-10-CM: G20 
ICD-9-CM: 332.0 Dyskinesia due to Parkinson's disease ([facility] [address]     ICD-10-CM: G24.9, G20 
ICD-9-CM: 781.3, 332.0 Lumbar back pain with radiculopathy affecting left lower extremity     ICD-10-CM: M54.17 ICD-9-CM: 724.4 Back pain of lumbar region with sciatica     ICD-10-CM: M54.40 ICD-9-CM: 724.2, 724.3 Cervical spondylosis without myelopathy     ICD-10-CM: U30.521 ICD-9-CM: 721.0 Neck pain     ICD-10-CM: M54.2 ICD-9-CM: 723.1 Vitals BP Pulse Height(growth percentile) Weight(growth percentile) SpO2 BMI  
 104/62 80 6' 2\" (1.88 m) 251 lb (113.9 kg) 95% 32.23 kg/m2 Smoking Status Never Smoker BMI and BSA Data Body Mass Index Body Surface Area  
 32.23 kg/m 2 2.44 m 2 Preferred Pharmacy Pharmacy Name Phone [place] [phone] [address], [address] AT [place] [address] [phone] Your Updated Medication List  
  
   
 This list is accurate as of 9/7/18  2:37 PM.  Always use your most recent med list.  
  
  
  
  
 amantadine HCl 137 mg Cp24 Commonly known as:  Aakash Chiu Take 2 Caps by mouth nightly. carbidopa-levodopa  mg per tablet Commonly known as:  SINEMET  
TAKE 1.5 TABLETS BY MOUTH 5 TIMES DAILY CENTRUM SILVER Tab tablet Generic drug:  multivitamins-minerals-lutein Take  by mouth. rOPINIRole 1 mg tablet Commonly known as:  Esmer Lutz Take 1 Tab by mouth three (3) times daily. trihexyphenidyl 2 mg tablet Commonly known as:  ARTANE  
TAKE 1 TABLET BY MOUTH THREE TIMES DAILY  
  
 VITAMIN D3 1,000 unit Cap Generic drug:  cholecalciferol Take  by mouth. Prescriptions Sent to Pharmacy Refills  
 trihexyphenidyl (ARTANE) 2 mg tablet 3 Sig: TAKE 1 TABLET BY MOUTH THREE TIMES DAILY Class: Normal  
 Pharmacy: [facility] [place] [address] Ph #: [phone]  
 amantadine HCl (GOCOVRI) 137 mg cp24 11 Sig: Take 2 Caps by mouth nightly. Class: Normal  
 Pharmacy: [facility] [place] [address]  Ph #: [phone] Route: Oral  
 carbidopa-levodopa (SINEMET)  mg per tablet 5 Sig: TAKE 1.5 TABLETS BY MOUTH 5 TIMES DAILY Class: Normal  
 Pharmacy: [facility] [place] [address] Ph #: [phone]  
 rOPINIRole (REQUIP) 1 mg tablet 11 Sig: Take 1 Tab by mouth three (3) times daily. Class: Normal  
 Pharmacy: [place] [address]  Ph #: [phone] Route: Oral  
  
Follow-up Instructions Return in about 6 months (around 3/7/2019). Patient Instructions Office Policies o Phone calls/patient messages: Please allow up to 24 hours for someone in the office to contact you about your call or message. Be mindful your provider may be out of the office or your message may require further review. We encourage you to use [employer] for your messages as this is a faster, more efficient way to communicate with our office 
 
o Medication Refills: 
Prescription medications require up to 48 business hours to process. We encourage you to use [employer] for your refills. For controlled medications: Please allow up to 72 business hours to process. Certain medications may require you to  a written prescription at our office. NO narcotic/controlled medications will be prescribed after 4pm Monday through Friday or on weekends 
 
o Form/Paperwork Completion: 
Please note there is a $25 fee for all paperwork completed by our providers. We ask that you allow 7-14 business days. Pre-payment is due prior to picking up/faxing the completed form. You may also download your forms to [employer] to have your doctor print off. A Healthy Lifestyle: Care Instructions Your Care Instructions A healthy lifestyle can help you feel good, stay at a healthy weight, and have plenty of energy for both work and play. A healthy lifestyle is something you can share with your whole family. A healthy lifestyle also can lower your risk for serious health problems, such as high blood pressure, heart disease, and diabetes. You can follow a few steps listed below to improve your health and the health of your family. Follow-up care is a key part of your treatment and safety. Be sure to make and go to all appointments, and call your doctor if you are having problems. It's also a good idea to know your test results and keep a list of the medicines you take. How can you care for yourself at home? · Do not eat too much sugar, fat, or fast foods.  You can still have dessert and treats now and then. The goal is moderation. · Start small to improve your eating habits. Pay attention to portion sizes, drink less juice and soda pop, and eat more fruits and vegetables. ¨ Eat a healthy amount of food. A 3-ounce serving of meat, for example, is about the size of a deck of cards. Fill the rest of your plate with vegetables and whole grains. ¨ Limit the amount of soda and sports drinks you have every day. Drink more water when you are thirsty. ¨ Eat at least 5 servings of fruits and vegetables every day. It may seem like a lot, but it is not hard to reach this goal. A serving or helping is 1 piece of fruit, 1 cup of vegetables, or 2 cups of leafy, raw vegetables. Have an apple or some carrot sticks as an afternoon snack instead of a candy bar. Try to have fruits and/or vegetables at every meal. 
· Make exercise part of your daily routine. You may want to start with simple activities, such as walking, bicycling, or slow swimming. Try to be active 30 to 60 minutes every day. You do not need to do all 30 to 60 minutes all at once. For example, you can exercise 3 times a day for 10 or 20 minutes. Moderate exercise is safe for most people, but it is always a good idea to talk to your doctor before starting an exercise program. 
· Keep moving. Jaylenyasmine Tao the lawn, work in the garden, or [employer] Take the stairs instead of the elevator at work. · If you smoke, quit. People who smoke have an increased risk for heart attack, stroke, cancer, and other lung illnesses. Quitting is hard, but there are ways to boost your chance of quitting tobacco for good. ¨ Use nicotine gum, patches, or lozenges. ¨ Ask your doctor about stop-smoking programs and medicines. ¨ Keep trying.  
In addition to reducing your risk of diseases in the future, you will notice some benefits soon after you stop using tobacco. If you have shortness of breath or asthma symptoms, they will likely get better within a few weeks after you quit. · Limit how much alcohol you drink. Moderate amounts of alcohol (up to 2 drinks a day for men, 1 drink a day for women) are okay. But drinking too much can lead to liver problems, high blood pressure, and other health problems. Family health If you have a family, there are many things you can do together to improve your health. · Eat meals together as a family as often as possible. · Eat healthy foods. This includes fruits, vegetables, lean meats and dairy, and whole grains. · Include your family in your fitness plan. Most people think of activities such as jogging or tennis as the way to fitness, but there are many ways you and your family can be more active. Anything that makes you breathe hard and gets your heart pumping is exercise. Here are some tips: 
¨ Walk to do errands or to take your child to school or the bus. ¨ Go for a family bike ride after dinner instead of watching TV. Where can you learn more? Go to http://von-joanne.info/. Enter Q094 in the search box to learn more about \"A Healthy Lifestyle: Care Instructions. \" Current as of: December 7, 2017 Content Version: 11.7 © 3779-8110 [employer]. Care instructions adapted under license by [employer] (which disclaims liability or warranty for this information). If you have questions about a medical condition or this instruction, always ask your healthcare professional. [place] [address] any warranty or liability for your use of this information. Introducing \[facility] [place] [facility]\"" & HEALTH SERVICES! Renee Palacios introduces [employer] patient portal. Now you can access parts of your medical record, email your doctor's office, and request medication refills online. 1. In your internet browser, go to [email or web address] 2. Click on the First Time User? Click Here link in the Sign In box. You will see the New Member Sign Up page. 3. Enter your [employer] Access Code exactly as it appears below. You will not need to use this code after youve completed the sign-up process. If you do not sign up before the expiration date, you must request a new code. · [employer] Access Code: [email or web address] Expires: 12/6/2018  2:13 PM 
 
4. Enter the last four digits of your Social Security Number (xxxx) and Date of Birth (mm/dd/yyyy) as indicated and click Submit. You will be taken to the next sign-up page. 5. Create a Inside Jobs ID. This will be your [employer] login ID and cannot be changed, so think of one that is secure and easy to remember. 6. Create a [employer] password. You can change your password at any time. 7. Enter your Password Reset Question and Answer. This can be used at a later time if you forget your password. 8. Enter your e-mail address. You will receive e-mail notification when new information is available in [address]. 9. Click Sign Up. You can now view and download portions of your medical record. 10. Click the Download Summary menu link to download a portable copy of your medical information. If you have questions, please visit the Frequently Asked Questions section of the [employer] website. Remember, [employer] is NOT to be used for urgent needs. For medical emergencies, dial 911. Now available from your iPhone and Android! Please provide this summary of care documentation to your next provider. Your primary care clinician is listed as Maximiliano Mejias. If you have any questions after today's visit, please call [phone].

## 2018-09-12 ENCOUNTER — TELEPHONE (OUTPATIENT)
Dept: NEUROLOGY | Age: 53
End: 2018-09-12

## 2018-09-12 DIAGNOSIS — G20 PARKINSON'S DISEASE (HCC): Primary | ICD-10-CM

## 2018-09-12 RX ORDER — TRIHEXYPHENIDYL HYDROCHLORIDE 2 MG/1
2 TABLET ORAL 4 TIMES DAILY
Qty: 120 TAB | Refills: 11 | Status: SHIPPED | OUTPATIENT
Start: 2018-09-12 | End: 2019-04-30 | Stop reason: SDUPTHER

## 2018-09-12 RX ORDER — BENZTROPINE MESYLATE 0.5 MG/1
0.5 TABLET ORAL 3 TIMES DAILY
Qty: 100 TAB | Refills: 5 | Status: SHIPPED | OUTPATIENT
Start: 2018-09-12 | End: 2018-09-12 | Stop reason: ALTCHOICE

## 2018-09-12 NOTE — TELEPHONE ENCOUNTER
The Artane is on back order and wanting an alternative. Please advise if there is one or should they try somewhere else?

## 2018-09-12 NOTE — TELEPHONE ENCOUNTER
So now they got some in and I had the Cogentin deleted with the pharmacist. Can you delete the Cogentin and put the Artane back in?   They will notify us if there are further problems

## 2018-10-07 DIAGNOSIS — G20 PARKINSON'S DISEASE (HCC): ICD-10-CM

## 2018-10-07 RX ORDER — CARBIDOPA AND LEVODOPA 25; 100 MG/1; MG/1
TABLET ORAL
Qty: 675 TAB | Refills: 0 | Status: SHIPPED | OUTPATIENT
Start: 2018-10-07 | End: 2019-04-30 | Stop reason: ALTCHOICE

## 2018-11-13 DIAGNOSIS — M54.40 BACK PAIN OF LUMBAR REGION WITH SCIATICA: ICD-10-CM

## 2018-11-13 DIAGNOSIS — G20 DYSKINESIA DUE TO PARKINSON'S DISEASE (HCC): ICD-10-CM

## 2018-11-13 DIAGNOSIS — M54.2 NECK PAIN: ICD-10-CM

## 2018-11-13 DIAGNOSIS — M54.16 LUMBAR BACK PAIN WITH RADICULOPATHY AFFECTING LEFT LOWER EXTREMITY: ICD-10-CM

## 2018-11-13 DIAGNOSIS — G24.9 DYSKINESIA DUE TO PARKINSON'S DISEASE (HCC): ICD-10-CM

## 2018-11-13 DIAGNOSIS — G20 PARKINSON'S DISEASE (HCC): ICD-10-CM

## 2018-11-13 DIAGNOSIS — M47.812 CERVICAL SPONDYLOSIS WITHOUT MYELOPATHY: ICD-10-CM

## 2018-11-14 ENCOUNTER — TELEPHONE (OUTPATIENT)
Dept: NEUROLOGY | Age: 53
End: 2018-11-14

## 2018-11-14 NOTE — TELEPHONE ENCOUNTER
----- Message from Julieta Nicholas sent at 11/14/2018 12:37 PM EST -----  Regarding: Dr. Enzo Almeida([facility]) requested a new Rx for pt's \"Go-Covri\"  Lavonne Schilder stated several faxed requests has been sent, but no response from the doctor. Pt is out of refills. Best contact number [phone].

## 2018-11-15 NOTE — TELEPHONE ENCOUNTER
Went through jono on 11/13/18  Called and found that the medication is going out tomorrow and nothing further is needed

## 2019-04-30 ENCOUNTER — OFFICE VISIT (OUTPATIENT)
Dept: NEUROLOGY | Age: 54
End: 2019-04-30

## 2019-04-30 VITALS
DIASTOLIC BLOOD PRESSURE: 56 MMHG | BODY MASS INDEX: 32.08 KG/M2 | OXYGEN SATURATION: 95 % | HEIGHT: 74 IN | SYSTOLIC BLOOD PRESSURE: 104 MMHG | WEIGHT: 250 LBS | RESPIRATION RATE: 16 BRPM | HEART RATE: 81 BPM

## 2019-04-30 DIAGNOSIS — M47.812 CERVICAL SPONDYLOSIS WITHOUT MYELOPATHY: ICD-10-CM

## 2019-04-30 DIAGNOSIS — G20 PARKINSON'S DISEASE (HCC): ICD-10-CM

## 2019-04-30 DIAGNOSIS — M54.2 NECK PAIN: ICD-10-CM

## 2019-04-30 DIAGNOSIS — M54.16 LUMBAR BACK PAIN WITH RADICULOPATHY AFFECTING LEFT LOWER EXTREMITY: ICD-10-CM

## 2019-04-30 DIAGNOSIS — G20 DYSKINESIA DUE TO PARKINSON'S DISEASE (HCC): ICD-10-CM

## 2019-04-30 DIAGNOSIS — M54.40 BACK PAIN OF LUMBAR REGION WITH SCIATICA: ICD-10-CM

## 2019-04-30 DIAGNOSIS — G24.9 DYSKINESIA DUE TO PARKINSON'S DISEASE (HCC): ICD-10-CM

## 2019-04-30 RX ORDER — TRIHEXYPHENIDYL HYDROCHLORIDE 2 MG/1
2 TABLET ORAL 4 TIMES DAILY
Qty: 360 TAB | Refills: 5 | Status: SHIPPED | OUTPATIENT
Start: 2019-04-30 | End: 2020-07-16 | Stop reason: SDUPTHER

## 2019-04-30 RX ORDER — ROPINIROLE 1 MG/1
1 TABLET, FILM COATED ORAL 3 TIMES DAILY
Qty: 270 TAB | Refills: 5 | Status: SHIPPED | OUTPATIENT
Start: 2019-04-30 | End: 2020-10-16 | Stop reason: SDUPTHER

## 2019-04-30 RX ORDER — CARBIDOPA AND LEVODOPA 25; 100 MG/1; MG/1
TABLET ORAL
Qty: 675 TAB | Refills: 5 | Status: SHIPPED | OUTPATIENT
Start: 2019-04-30 | End: 2020-07-16 | Stop reason: SDUPTHER

## 2019-04-30 NOTE — LETTER
4/30/2019 8:26 PM 
 
Patient:  Marvin Christy YOB: 1965 Date of Visit: 4/30/2019 Dear No Recipients: Thank you for referring Mr. Reginald Iqbal to me for evaluation/treatment. Below are the relevant portions of my assessment and plan of care. Consult Subjective:  
 
Marvin Christy is a 48 y. o.right-handed  male seen today for evaluation at the request of Dr. Dionte Araujo of [place] problem of wanting to discuss taking new medication of L-dopa nasal spray for his Parkinson's disease, but the patient is doing much better on his current dose of medication with Gocovri which we added last time, he is now taking 2 at nighttime, and doing much better, and his dyskinesias are much better in addition, he does not really have any off phenomena, so advised the patient that most likely the inhaled Sinemet probably is not a good drug for him because mostly it used for off periods as a rescue medication and he seems to understand that now. He actually feels that he is doing well now on his medication, and looks better than he has in a long time. He is still able to work part-time. His dyskinesias are markedly improved. He takes 1-1/2 Sinemet 4 times a day and occasionally 5 times a day, and takes Artane 2 mg 4 times a day, and takes equate 1 mg 3 times a day as his other medications. He had to stop the Eldepryl because it was too expensive. Patient could not afford the Neupro patches anymore and wanted to switch back to Requip. The patches worked very well, but he just cannot afford them. I asked him to continue his Artane at bid each day but he says he needs it 3 times a day. He thought Mirapex made him sick. His back is gotten somewhat better with physical therapy. He is limited in his finances and I am not sure he will be able to afford Rytary.   He has had no clear focal weakness, and only migratory sensory loss in his leg. He is still able to work some, and states that he thinks he might be getting slowly better and wants to wait before any diagnostic testing done. His bowel and bladder functions normal and he has no radicular symptoms into the left leg. He had mild dyskinesia but is moving well today. He most likely had end of dose dystonia. Patient has a 16 year history of Parkinson's disease, and a problem with neck pain and tightness in his neck and headaches in the posterior head regions. He seems to show restrictive range of motion particularly to the right on exam suggesting degenerative arthritis of his neck, and cervical spine x-rays do show some mild degenerative changes at C5-6. He is tolerating the medication well, and has not noticed any side effects. He's had no new weakness or sensory loss or visual changes cognitive issues or gait problems. He is taking his vitamins regularly and we did advise him he needs to do that and his vitamin D. He could not afford Azilect or the Neupro patch. His complete review of systems and symptoms was negative for any other new medical problems, complications or illnesses. He has a history of Parkinson's disease, kidney stones, neck pain and cervical spondylosis and history of vein stripping. Past Medical History:  
Diagnosis Date  Kidney disease  Movement disorder  Nephrolithiasis  Neurological disorder  Other ill-defined conditions(799.89) Parkinson disease  Parkinson disease ([facility] [address] Past Surgical History:  
Procedure Laterality Date  HX VEIN STRIPPING Family History Problem Relation Age of Onset  Hypertension Mother  Heart Disease Mother  Parkinsonism Father  Arthritis-osteo Father  Seizures Sister Social History Tobacco Use  Smoking status: Never Smoker  Smokeless tobacco: Never Used Substance Use Topics  Alcohol use: Yes Comment: rare Current Outpatient Medications Medication Sig Dispense Refill  carbidopa-levodopa (SINEMET)  mg per tablet TAKE 1.5 TABLETS BY MOUTH 5 TIMES DAILY 675 Tab 5  
 rOPINIRole (REQUIP) 1 mg tablet Take 1 Tab by mouth three (3) times daily. 270 Tab 5  
 trihexyphenidyl (ARTANE) 2 mg tablet Take 1 Tab by mouth four (4) times daily. 360 Tab 5  
 amantadine HCl (GOCOVRI) 137 mg cp24 Take 2 Caps by mouth nightly. 60 Cap 11  
 multivitamins-minerals-lutein (CENTRUM SILVER) Tab Take  by mouth.  Cholecalciferol, Vitamin D3, (VITAMIN D3) 1,000 unit cap Take  by mouth. No Known Allergies Review of Systems: A comprehensive review of systems was negative except for: Neurological: positive for coordination problems, gait problems and tremor Objective: I 
 
 
NEUROLOGICAL EXAM: 
 
Appearance: The patient is well developed, well nourished, provides a coherent history and is in no acute distress. Patient had minimal dyskinesias. Mental Status: Oriented to time, place and person and the president, cognitive function and fund of knowledge seemed normal though he is a little slow mentally. Ethelene Gauss Speech is fluent without aphasia or dysarthria. Mood and affect appropriate, but anxious and depressed. Cranial Nerves:   Intact visual fields. Fundi are benign, discs are flat. JUAN M, EOM's full, no nystagmus, no ptosis. Facial sensation is normal. Corneal reflexes are not tested. Facial movement is symmetric, but the patient has mild bilateral facial dyskinesias. Hearing is abnormal bilaterally. Palate is midline with normal sternocleidomastoid and trapezius muscles are normal. Tongue is midline. Neck without meningismus or bruits Temporal arteries are not tender or enlarged Motor:  5/5 strength in upper and lower proximal and distal muscles.  Normal bulk and mild increased tone with cogwheel rigidity and bradykinesia in both upper extremities right side slightly greater than the left. No fasciculations. Patient has minimal dyskinesias of the face and extremities bilaterally. Patient has mild decreased range of motion of the neck on exam particularly to the right, with some tenderness of the cervical muscles. Reflexes:   Deep tendon reflexes 2+/4 and symmetrical. No Babinski or clonus present. Patient has negative straight-leg raising test in the sitting position bilaterally Patient has mild percussion tenderness over the lower lumbar spine Patient can almost bend over and touch his toes Sensory:   Normal to touch, pinprick and vibration and DSS. Gait:  Abnormal gait for slight dyskinesia in his arms and legs and slightly decreased arm swing in his right upper extremity. Tremor:   No tremor noted. Cerebellar:   Mildly abnormal Romberg and tandem cerebellar signs present. Finger-nose-finger examination shows no dysmetria Neurovascular:  Normal heart sounds and regular rhythm, peripheral pulses decreased in his feet, and no carotid bruits. Assessment:  
 
 
 
Plan:  
 
Patient wants to discuss other alternative medications, and we discussed the new nasal spray Sinemet, and after long discussion I explained why it was not a good medicine for him at this time, but may be in the future. We discussed other medications and I spent a long time today 20 minutes of the 35 minute visit counseling the patient on all of this. Patient with problem of persistent dyskinesias but much better on his medication now,, will continue Gocovri for the patient today to help with his Parkinson's and dyskinesias which seems to help some His medications were all renewed for him today and sent into the pharmacy for him. Side effects of the medications were explained to the patient including drowsiness, confusion, agitation, or any side effect is to call us immediately.  
Patient with increasing problems of being able to afford his medications,  
 Parkinson's disease with increasing morning end of dose dystonia, Because of his increasing ataxia and unsteady gait we tried unsuccessfully to taper him on Artane he will continue his dose 4 times a day now Cervical spondylosis with muscle contraction headaches Lumbar radiculopathy into the right leg probably secondary to degenerative disc disease Patient is to make sure he does his exercises for cervical spondylosis and lumbar spinal stenosis Patient is to continue his neck exercises, and advised to take anti-inflammatories as needed and continue a regular exercise program. 
Patient is to continue his current dose of other medications. He's doing a little worse with his gait and dyskinesias He is to remain mentally and physically active and start taking vitamins and vitamin D more regularly and try to exercise more. He'll be seen again in 6 months time or earlier if needed Time of visit was 35 minutes today, counseling the patient, going over his medications, discussing therapeutic options with him in detail. Signed By: Praveen Navarrete MD   
 April 30, 2019 This note will not be viewable in [address] If you have questions, please do not hesitate to call me. I look forward to following Mr. Dina Madrid along with you. Sincerely, Praveen Navarrete MD

## 2019-05-01 ENCOUNTER — OFFICE VISIT (OUTPATIENT)
Dept: INTERNAL MEDICINE CLINIC | Age: 54
End: 2019-05-01

## 2019-05-01 VITALS
HEART RATE: 83 BPM | HEIGHT: 73 IN | WEIGHT: 245 LBS | SYSTOLIC BLOOD PRESSURE: 124 MMHG | BODY MASS INDEX: 32.47 KG/M2 | DIASTOLIC BLOOD PRESSURE: 84 MMHG | RESPIRATION RATE: 16 BRPM | OXYGEN SATURATION: 95 % | TEMPERATURE: 98.1 F

## 2019-05-01 DIAGNOSIS — G24.9 DYSKINESIA DUE TO PARKINSON'S DISEASE (HCC): ICD-10-CM

## 2019-05-01 DIAGNOSIS — G20 PARKINSON'S DISEASE (HCC): Primary | ICD-10-CM

## 2019-05-01 DIAGNOSIS — E66.09 CLASS 1 OBESITY DUE TO EXCESS CALORIES WITHOUT SERIOUS COMORBIDITY WITH BODY MASS INDEX (BMI) OF 32.0 TO 32.9 IN ADULT: ICD-10-CM

## 2019-05-01 DIAGNOSIS — G20 DYSKINESIA DUE TO PARKINSON'S DISEASE (HCC): ICD-10-CM

## 2019-05-01 NOTE — PROGRESS NOTES
Guzman Beck  Identified pt with two pt identifiers(name and ). Chief Complaint Patient presents with Royce Goins Establish Care New patient here to establish care. Referred by Dr Araiza Fat his neurologist.   
 
1. Have you been to the ER, urgent care clinic since your last visit? Hospitalized since your last visit? no 
 
2. Have you seen or consulted any other health care providers outside of the [address] [place] [address] since your last visit? Include any pap smears or colon screening. no 
 
 
Medication reconciliation up to date and corrected with patient at this time. Today's provider has been notified of reason for visit, vitals and flowsheets obtained on patients. Reviewed record in preparation for visit, huddled with provider and have obtained necessary documentation. Depression Risk Factor Screening:  
 
3 most recent PHQ Screens 2019 PHQ Not Done Patient Decline Functional Ability and Level of Safety: Activities of Daily Living No flowsheet data found. Fall Risk Fall Risk Assessment, last 12 mths 2019 Able to walk? Yes Fall in past 12 months? Yes Fall with injury? Yes  
Number of falls in past 12 months 3 Fall Risk Score 4 Abuse Screen No flowsheet data found. Health Maintenance Due Topic  DTaP/Tdap/Td series (1 - Tdap)  Shingrix Vaccine Age 50> (1 of 2)  FOBT Q 1 YEAR AGE 50-75  MEDICARE YEARLY EXAM   
 
 
Wt Readings from Last 3 Encounters:  
19 245 lb (111.1 kg) 19 250 lb (113.4 kg) 18 251 lb (113.9 kg) Temp Readings from Last 3 Encounters:  
19 98.1 °F (36.7 °C) (Oral) 18 98 °F (36.7 °C)  
18 98 °F (36.7 °C) BP Readings from Last 3 Encounters:  
19 124/84  
19 104/56  
18 104/62 Pulse Readings from Last 3 Encounters:  
19 83  
19 81  
18 80 Vitals:  
 19 1323 BP: 124/84 Pulse: 83 Resp: 16  
 Temp: 98.1 °F (36.7 °C) TempSrc: Oral  
SpO2: 95% Weight: 245 lb (111.1 kg) Height: 6' 1\" (1.854 m) PainSc:   0 - No pain Patient Care Team  
Patient Care Team: 
Almaz Salas MD as PCP - [facility]) Breana Ortiz MD (Neurology)

## 2019-05-01 NOTE — PROGRESS NOTES
Consult Subjective:  
 
Suzanne Suarez is a 48 y. o.right-handed  male seen today for evaluation at the request of Dr. Frances Butler of New problem of wanting to discuss taking new medication of L-dopa nasal spray for his Parkinson's disease, but the patient is doing much better on his current dose of medication with Gocovri which we added last time, he is now taking 2 at nighttime, and doing much better, and his dyskinesias are much better in addition, he does not really have any off phenomena, so advised the patient that most likely the inhaled Sinemet probably is not a good drug for him because mostly it used for off periods as a rescue medication and he seems to understand that now. He actually feels that he is doing well now on his medication, and looks better than he has in a long time. He is still able to work part-time. His dyskinesias are markedly improved. He takes 1-1/2 Sinemet 4 times a day and occasionally 5 times a day, and takes Artane 2 mg 4 times a day, and takes equate 1 mg 3 times a day as his other medications. He had to stop the Eldepryl because it was too expensive. Patient could not afford the Neupro patches anymore and wanted to switch back to Requip. The patches worked very well, but he just cannot afford them. I asked him to continue his Artane at bid each day but he says he needs it 3 times a day. He thought Mirapex made him sick. His back is gotten somewhat better with physical therapy. He is limited in his finances and I am not sure he will be able to afford Rytary. He has had no clear focal weakness, and only migratory sensory loss in his leg. He is still able to work some, and states that he thinks he might be getting slowly better and wants to wait before any diagnostic testing done. His bowel and bladder functions normal and he has no radicular symptoms into the left leg.   He had mild dyskinesia but is moving well today. He most likely had end of dose dystonia. Patient has a 16 year history of Parkinson's disease, and a problem with neck pain and tightness in his neck and headaches in the posterior head regions. He seems to show restrictive range of motion particularly to the right on exam suggesting degenerative arthritis of his neck, and cervical spine x-rays do show some mild degenerative changes at C5-6. He is tolerating the medication well, and has not noticed any side effects. He's had no new weakness or sensory loss or visual changes cognitive issues or gait problems. He is taking his vitamins regularly and we did advise him he needs to do that and his vitamin D. He could not afford Azilect or the Neupro patch. His complete review of systems and symptoms was negative for any other new medical problems, complications or illnesses. He has a history of Parkinson's disease, kidney stones, neck pain and cervical spondylosis and history of vein stripping. Past Medical History:  
Diagnosis Date  Kidney disease  Movement disorder  Nephrolithiasis  Neurological disorder  Other ill-defined conditions(799.89) Parkinson disease  Parkinson disease ([facility] [address] Past Surgical History:  
Procedure Laterality Date  HX VEIN STRIPPING Family History Problem Relation Age of Onset  Hypertension Mother  Heart Disease Mother  Parkinsonism Father  Arthritis-osteo Father  Seizures Sister Social History Tobacco Use  Smoking status: Never Smoker  Smokeless tobacco: Never Used Substance Use Topics  Alcohol use: Yes Comment: rare Current Outpatient Medications Medication Sig Dispense Refill  carbidopa-levodopa (SINEMET)  mg per tablet TAKE 1.5 TABLETS BY MOUTH 5 TIMES DAILY 675 Tab 5  
 rOPINIRole (REQUIP) 1 mg tablet Take 1 Tab by mouth three (3) times daily. 270 Tab 5  trihexyphenidyl (ARTANE) 2 mg tablet Take 1 Tab by mouth four (4) times daily. 360 Tab 5  
 amantadine HCl (GOCOVRI) 137 mg cp24 Take 2 Caps by mouth nightly. 60 Cap 11  
 multivitamins-minerals-lutein (CENTRUM SILVER) Tab Take  by mouth.  Cholecalciferol, Vitamin D3, (VITAMIN D3) 1,000 unit cap Take  by mouth. No Known Allergies Review of Systems: A comprehensive review of systems was negative except for: Neurological: positive for coordination problems, gait problems and tremor Objective: I 
 
 
NEUROLOGICAL EXAM: 
 
Appearance: The patient is well developed, well nourished, provides a coherent history and is in no acute distress. Patient had minimal dyskinesias. Mental Status: Oriented to time, place and person and the president, cognitive function and fund of knowledge seemed normal though he is a little slow mentally. Jayshreeann Gan Speech is fluent without aphasia or dysarthria. Mood and affect appropriate, but anxious and depressed. Cranial Nerves:   Intact visual fields. Fundi are benign, discs are flat. JUAN M, EOM's full, no nystagmus, no ptosis. Facial sensation is normal. Corneal reflexes are not tested. Facial movement is symmetric, but the patient has mild bilateral facial dyskinesias. Hearing is abnormal bilaterally. Palate is midline with normal sternocleidomastoid and trapezius muscles are normal. Tongue is midline. Neck without meningismus or bruits Temporal arteries are not tender or enlarged Motor:  5/5 strength in upper and lower proximal and distal muscles. Normal bulk and mild increased tone with cogwheel rigidity and bradykinesia in both upper extremities right side slightly greater than the left. No fasciculations. Patient has minimal dyskinesias of the face and extremities bilaterally. Patient has mild decreased range of motion of the neck on exam particularly to the right, with some tenderness of the cervical muscles. Reflexes:   Deep tendon reflexes 2+/4 and symmetrical. No Babinski or clonus present. Patient has negative straight-leg raising test in the sitting position bilaterally Patient has mild percussion tenderness over the lower lumbar spine Patient can almost bend over and touch his toes Sensory:   Normal to touch, pinprick and vibration and DSS. Gait:  Abnormal gait for slight dyskinesia in his arms and legs and slightly decreased arm swing in his right upper extremity. Tremor:   No tremor noted. Cerebellar:   Mildly abnormal Romberg and tandem cerebellar signs present. Finger-nose-finger examination shows no dysmetria Neurovascular:  Normal heart sounds and regular rhythm, peripheral pulses decreased in his feet, and no carotid bruits. Assessment:  
 
 
 
Plan:  
 
Patient wants to discuss other alternative medications, and we discussed the new nasal spray Sinemet, and after long discussion I explained why it was not a good medicine for him at this time, but may be in the future. We discussed other medications and I spent a long time today 20 minutes of the 35 minute visit counseling the patient on all of this. Patient with problem of persistent dyskinesias but much better on his medication now,, will continue Gocovri for the patient today to help with his Parkinson's and dyskinesias which seems to help some His medications were all renewed for him today and sent into the pharmacy for him. Side effects of the medications were explained to the patient including drowsiness, confusion, agitation, or any side effect is to call us immediately. Patient with increasing problems of being able to afford his medications,  
Parkinson's disease with increasing morning end of dose dystonia, Because of his increasing ataxia and unsteady gait we tried unsuccessfully to taper him on Artane he will continue his dose 4 times a day now Cervical spondylosis with muscle contraction headaches Lumbar radiculopathy into the right leg probably secondary to degenerative disc disease Patient is to make sure he does his exercises for cervical spondylosis and lumbar spinal stenosis Patient is to continue his neck exercises, and advised to take anti-inflammatories as needed and continue a regular exercise program. 
Patient is to continue his current dose of other medications. He's doing a little worse with his gait and dyskinesias He is to remain mentally and physically active and start taking vitamins and vitamin D more regularly and try to exercise more. He'll be seen again in 6 months time or earlier if needed Time of visit was 35 minutes today, counseling the patient, going over his medications, discussing therapeutic options with him in detail. Signed By: Gladys London MD   
 April 30, 2019 This note will not be viewable in [address]

## 2019-05-01 NOTE — PROGRESS NOTES
Subjective:  Mr. Taryn Collins is a pleasant 48year old gentleman who comes in today to get established as a new patient. He previously was under the care of Dr. Vinay Nagel, but he thinks that it is too much distance to travel to the [address] [id number]. Today he has no complaints. Past Medical History:  
Diagnosis Date  Kidney disease  Movement disorder  Nephrolithiasis  Neurological disorder  Other ill-defined conditions(799.89) Parkinson disease  Parkinson disease ([facility] [address] Past Surgical History:  
Procedure Laterality Date  HX VEIN STRIPPING    
 HX WISDOM TEETH EXTRACTION Current Outpatient Medications on File Prior to Visit Medication Sig Dispense Refill  carbidopa-levodopa (SINEMET)  mg per tablet TAKE 1.5 TABLETS BY MOUTH 5 TIMES DAILY 675 Tab 5  
 rOPINIRole (REQUIP) 1 mg tablet Take 1 Tab by mouth three (3) times daily. 270 Tab 5  
 trihexyphenidyl (ARTANE) 2 mg tablet Take 1 Tab by mouth four (4) times daily. 360 Tab 5  
 amantadine HCl (GOCOVRI) 137 mg cp24 Take 2 Caps by mouth nightly. 60 Cap 11  
 multivitamins-minerals-lutein (CENTRUM SILVER) Tab Take  by mouth.  Cholecalciferol, Vitamin D3, (VITAMIN D3) 1,000 unit cap Take  by mouth. No current facility-administered medications on file prior to visit. No Known Allergies Past Medical History/Surgeries: 1. Left lower extremity vein stripping. Illnesses: 1. Parkinson's disease since age 47, for which he is under the care of Dr. Lorenzo Salter. He tells me that he is doing relatively well on his current regimen. Family History:  Father is 68years of age, alive with Parkinson's disease. Mother is 76years of age, alive with hypertension. Two siblings are living. One brother has had neck cancer, treated with chemo and radiation and appears to be doing well now. Social History:  He is single.   He works part time at some events, mostly overnight. He has a 24year old daughter, currently in a nursing program. 
 
Allergies:  None. Medications: 
1. Sinemet  mg, one and a half tablets by mouth five times daily. 2. Requip 1 mg three times a day. 3. Artane 2 mg four times a day. 4. Gocovri 137 mg, two tablets at bedtime. 5. Multivitamin daily. 6. Vitamin D 3,000 units daily. Habits:  Has an occasional drink. Review of Systems: 
HEENT:  Denies any headaches, dizziness or blurred vision. CVR:  Denies any chest pain or palpitations. Denies any shortness of breath, cough, wheezing, hemoptysis, PND or orthopnea. Denies any ankle edema. He does have chronic changes in his lower extremities from venous stasis. He tells me he has had this since a young age. GI:  Appetite is good, weight is stable. Does have a normal bowel pattern without presence of blood in stools or melena. :  Denies any urinary symptoms. MSK:  As noted previously, he has had Parkinson's disease for quite some time. He tells me he has more difficulty with his lower extremities than his upper extremities and that at times he has a sensation that he is dragging his feet. Physical Examination: 
GENERAL:  Pleasant gentleman in no acute distress. He is alert and oriented. VITALS:  BP: 124/84. P: 83.  R: 16.  T: 98.1. O2 sat: 95%. WT: 245 lbs. HEENT:  Normocephalic, atraumatic. PERRLA, EOMI. TMs normal.  Mouth mucosa pink. Tongue midline. Pharynx normal. 
NECK:  Supple without adenopathy, thyromegaly or carotid bruits. CHEST:  Lungs clear to auscultation, no rales or wheezes. CV:  Heart regular rhythm without murmur or gallop. ABDOMEN:  Soft, non tender, no organomegaly or masses. EXTREMITIES:  No edema or calf tenderness. Distal pulses were present. He does have rather marked venous stasis, left worse than right. There are no open lesions. NEUROLOGIC:  Cranial nerves II-XII intact.   Excellent strength in the upper and lower extremities against resistance. Sensation is preserved. Although he has some dyskinesia in his lower extremities, he does have fairly good coordination. Romberg is negative. Impression: 1. Parkinson's disease with persistent dyskinesia. Plan: 1. He was not fasting this morning so I am going to have him return at a later date for all of his lab studies. He is in agreement. 2. He declines a colonoscopy, but will accept Cologuard. 3. We did discuss the importance of a prudent diet, exercise and weight management to keep his BMI within acceptable level. 4. We also discussed the need for Shingrix vaccination. He is in agreement.

## 2019-05-01 NOTE — PATIENT INSTRUCTIONS
Body Mass Index: Care Instructions Your Care Instructions Body mass index (BMI) can help you see if your weight is raising your risk for health problems. It uses a formula to compare how much you weigh with how tall you are. · A BMI lower than 18.5 is considered underweight. · A BMI between 18.5 and 24.9 is considered healthy. · A BMI between 25 and 29.9 is considered overweight. A BMI of 30 or higher is considered obese. If your BMI is in the normal range, it means that you have a lower risk for weight-related health problems. If your BMI is in the overweight or obese range, you may be at increased risk for weight-related health problems, such as high blood pressure, heart disease, stroke, arthritis or joint pain, and diabetes. If your BMI is in the underweight range, you may be at increased risk for health problems such as fatigue, lower protection (immunity) against illness, muscle loss, bone loss, hair loss, and hormone problems. BMI is just one measure of your risk for weight-related health problems. You may be at higher risk for health problems if you are not active, you eat an unhealthy diet, or you drink too much alcohol or use tobacco products. Follow-up care is a key part of your treatment and safety. Be sure to make and go to all appointments, and call your doctor if you are having problems. It's also a good idea to know your test results and keep a list of the medicines you take. How can you care for yourself at home? · Practice healthy eating habits. This includes eating plenty of fruits, vegetables, whole grains, lean protein, and low-fat dairy. · If your doctor recommends it, get more exercise. Walking is a good choice. Bit by bit, increase the amount you walk every day. Try for at least 30 minutes on most days of the week. · Do not smoke. Smoking can increase your risk for health problems.  If you need help quitting, talk to your doctor about stop-smoking programs and medicines. These can increase your chances of quitting for good. · Limit alcohol to 2 drinks a day for men and 1 drink a day for women. Too much alcohol can cause health problems. If you have a BMI higher than 25 · Your doctor may do other tests to check your risk for weight-related health problems. This may include measuring the distance around your waist. A waist measurement of more than 40 inches in men or 35 inches in women can increase the risk of weight-related health problems. · Talk with your doctor about steps you can take to stay healthy or improve your health. You may need to make lifestyle changes to lose weight and stay healthy, such as changing your diet and getting regular exercise. If you have a BMI lower than 18.5 · Your doctor may do other tests to check your risk for health problems. · Talk with your doctor about steps you can take to stay healthy or improve your health. You may need to make lifestyle changes to gain or maintain weight and stay healthy, such as getting more healthy foods in your diet and doing exercises to build muscle. Where can you learn more? Go to http://von-joanne.info/. Enter S176 in the search box to learn more about \"Body Mass Index: Care Instructions. \" Current as of: June 25, 2018 Content Version: 11.9 © 7452-1070 [employer]. Care instructions adapted under license by [employer] (which disclaims liability or warranty for this information). If you have questions about a medical condition or this instruction, always ask your healthcare professional. [address] any warranty or liability for your use of this information.

## 2019-05-21 ENCOUNTER — LAB ONLY (OUTPATIENT)
Dept: INTERNAL MEDICINE CLINIC | Age: 54
End: 2019-05-21

## 2019-05-21 DIAGNOSIS — E78.5 HYPERLIPIDEMIA, UNSPECIFIED HYPERLIPIDEMIA TYPE: ICD-10-CM

## 2019-05-21 DIAGNOSIS — N40.0 BENIGN PROSTATIC HYPERPLASIA, UNSPECIFIED WHETHER LOWER URINARY TRACT SYMPTOMS PRESENT: ICD-10-CM

## 2019-05-21 DIAGNOSIS — R53.83 FATIGUE, UNSPECIFIED TYPE: ICD-10-CM

## 2019-05-21 DIAGNOSIS — R73.9 HYPERGLYCEMIA: ICD-10-CM

## 2019-05-21 DIAGNOSIS — E55.9 VITAMIN D DEFICIENCY: ICD-10-CM

## 2019-05-21 DIAGNOSIS — N39.0 URINARY TRACT INFECTION WITHOUT HEMATURIA, SITE UNSPECIFIED: ICD-10-CM

## 2019-05-21 DIAGNOSIS — Z12.11 COLON CANCER SCREENING: ICD-10-CM

## 2019-05-21 DIAGNOSIS — G20 PARKINSON'S DISEASE (HCC): Primary | ICD-10-CM

## 2019-05-21 LAB
25(OH)D3 SERPL-MCNC: 13 NG/ML (ref 30–96)
A-G RATIO,AGRAT: 1.5 RATIO
ALBUMIN SERPL-MCNC: 4.5 G/DL (ref 3.9–5.4)
ALP SERPL-CCNC: 89 U/L (ref 38–126)
ALT SERPL-CCNC: 10 U/L (ref 9–52)
ANION GAP SERPL CALC-SCNC: 16 MMOL/L
AST SERPL W P-5'-P-CCNC: 15 U/L (ref 14–36)
BACTERIA,BACTU: ABNORMAL
BILIRUB SERPL-MCNC: 1.7 MG/DL (ref 0.2–1.3)
BILIRUB UR QL: NEGATIVE
BUN SERPL-MCNC: 20 MG/DL (ref 9–20)
BUN/CREATININE RATIO,BUCR: 18 RATIO
CALCIUM SERPL-MCNC: 9.6 MG/DL (ref 8.4–10.2)
CHLORIDE SERPL-SCNC: 104 MMOL/L (ref 98–107)
CHOL/HDL RATIO,CHHD: 3 RATIO (ref 0–4)
CHOLEST SERPL-MCNC: 160 MG/DL (ref 0–200)
CLARITY: CLEAR
CO2 SERPL-SCNC: 23 MMOL/L (ref 22–32)
COLOR UR: ABNORMAL
CREAT SERPL-MCNC: 1.1 MG/DL (ref 0.8–1.5)
ERYTHROCYTE [DISTWIDTH] IN BLOOD BY AUTOMATED COUNT: 13.1 %
GLOBULIN,GLOB: 3
GLUCOSE 24H UR-MRATE: NEGATIVE G/(24.H)
GLUCOSE SERPL-MCNC: 90 MG/DL (ref 75–110)
HCT VFR BLD AUTO: 47 % (ref 37–51)
HDLC SERPL-MCNC: 49 MG/DL (ref 35–130)
HGB BLD-MCNC: 15.9 G/DL (ref 12–18)
HGB UR QL STRIP: NEGATIVE
KETONES UR QL STRIP.AUTO: NEGATIVE
LDL/HDL RATIO,LDHD: 2 RATIO
LDLC SERPL CALC-MCNC: 100 MG/DL (ref 0–130)
LEUKOCYTE ESTERASE: ABNORMAL
LYMPHOCYTES ABSOLUTE: 1.6 K/UL (ref 0.6–4.1)
LYMPHOCYTES NFR BLD: 28.1 % (ref 10–58.5)
MCH RBC QN AUTO: 31.2 PG (ref 26–32)
MCHC RBC AUTO-ENTMCNC: 33.8 G/DL (ref 30–36)
MCV RBC AUTO: 92.2 FL (ref 80–97)
MONOCYTES ABS-DIF,2141: 0.6 K/UL (ref 0–1.8)
MONOCYTES NFR BLD: 9.7 % (ref 0.1–24)
NEUTROPHILS # BLD: 62.2 % (ref 37–92)
NEUTROPHILS ABS,2156: 3.6 K/UL (ref 2–7.8)
NITRITE UR QL STRIP.AUTO: NEGATIVE
PH UR STRIP: 6 [PH] (ref 5–7)
PLATELET # BLD AUTO: 164 K/UL (ref 140–440)
PMV BLD AUTO: 9 FL
POTASSIUM SERPL-SCNC: 4.3 MMOL/L (ref 3.6–5)
PROT SERPL-MCNC: 7.5 G/DL (ref 6.3–8.2)
PROT UR STRIP-MCNC: NEGATIVE MG/DL
PSA, TEST22: 0.9 NG/ML (ref 0–4)
RBC # BLD AUTO: 5.1 M/UL (ref 4.2–6.3)
RBC #/AREA URNS HPF: ABNORMAL #/HPF
SODIUM SERPL-SCNC: 143 MMOL/L (ref 137–145)
SP GR UR REFRACTOMETRY: 1.02 (ref 1–1.03)
T4 FREE SERPL-MCNC: 1.1 NG/DL (ref 0.58–2.3)
TRIGL SERPL-MCNC: 53 MG/DL (ref 0–200)
TSH SERPL DL<=0.05 MIU/L-ACNC: 1.65 UIU/ML (ref 0.34–5.6)
UROBILINOGEN UR QL STRIP.AUTO: NEGATIVE
VLDLC SERPL CALC-MCNC: 11 MG/DL
WBC # BLD AUTO: 5.8 K/UL (ref 4.1–10.9)
WBC URNS QL MICRO: ABNORMAL #/HPF

## 2019-05-22 LAB
EST. AVERAGE GLUCOSE BLD GHB EST-MCNC: 111 MG/DL
HBA1C MFR BLD: 5.5 % (ref 4.8–5.6)

## 2019-05-23 DIAGNOSIS — E55.9 VITAMIN D DEFICIENCY: Primary | ICD-10-CM

## 2019-05-23 LAB — BACTERIA UR CULT: NORMAL

## 2019-05-23 RX ORDER — ERGOCALCIFEROL 1.25 MG/1
50000 CAPSULE ORAL
Qty: 12 CAP | Refills: 0 | Status: SHIPPED | OUTPATIENT
Start: 2019-05-23 | End: 2019-11-20

## 2019-05-23 NOTE — PROGRESS NOTES
Lab results discussed with patient. Vitamin d 13. Start vitamin d 5000 units weekly x 3 months. Repeat level in 3 months.

## 2019-10-21 ENCOUNTER — TELEPHONE (OUTPATIENT)
Dept: NEUROLOGY | Age: 54
End: 2019-10-21

## 2019-10-21 ENCOUNTER — HOSPITAL ENCOUNTER (OUTPATIENT)
Dept: GENERAL RADIOLOGY | Age: 54
Discharge: HOME | End: 2019-10-21
Payer: MEDICARE

## 2019-10-21 ENCOUNTER — OFFICE VISIT (OUTPATIENT)
Dept: NEUROLOGY | Age: 54
End: 2019-10-21

## 2019-10-21 ENCOUNTER — HOSPITAL ENCOUNTER (OUTPATIENT)
Dept: NON INVASIVE DIAGNOSTICS | Age: 54
Discharge: HOME | End: 2019-10-21
Payer: MEDICARE

## 2019-10-21 VITALS
DIASTOLIC BLOOD PRESSURE: 72 MMHG | HEIGHT: 73 IN | RESPIRATION RATE: 16 BRPM | SYSTOLIC BLOOD PRESSURE: 136 MMHG | HEART RATE: 99 BPM | OXYGEN SATURATION: 96 % | BODY MASS INDEX: 32.34 KG/M2 | WEIGHT: 244 LBS

## 2019-10-21 DIAGNOSIS — M54.16 LUMBAR BACK PAIN WITH RADICULOPATHY AFFECTING LEFT LOWER EXTREMITY: ICD-10-CM

## 2019-10-21 DIAGNOSIS — G20 PARKINSON'S DISEASE (HCC): ICD-10-CM

## 2019-10-21 DIAGNOSIS — I65.23 BILATERAL CAROTID ARTERY STENOSIS: ICD-10-CM

## 2019-10-21 DIAGNOSIS — G24.9 DYSKINESIA DUE TO PARKINSON'S DISEASE (HCC): ICD-10-CM

## 2019-10-21 DIAGNOSIS — M43.16 SPONDYLOLISTHESIS OF LUMBAR REGION: ICD-10-CM

## 2019-10-21 DIAGNOSIS — R55 SYNCOPE AND COLLAPSE: ICD-10-CM

## 2019-10-21 DIAGNOSIS — M47.812 CERVICAL SPONDYLOSIS WITHOUT MYELOPATHY: ICD-10-CM

## 2019-10-21 DIAGNOSIS — G20 DYSKINESIA DUE TO PARKINSON'S DISEASE (HCC): ICD-10-CM

## 2019-10-21 DIAGNOSIS — G24.9 DYSKINESIA DUE TO PARKINSON'S DISEASE (HCC): Primary | ICD-10-CM

## 2019-10-21 DIAGNOSIS — M54.40 BACK PAIN OF LUMBAR REGION WITH SCIATICA: ICD-10-CM

## 2019-10-21 DIAGNOSIS — G20 DYSKINESIA DUE TO PARKINSON'S DISEASE (HCC): Primary | ICD-10-CM

## 2019-10-21 LAB
ATRIAL RATE: 99 BPM
CALCULATED P AXIS, ECG09: 52 DEGREES
CALCULATED R AXIS, ECG10: 90 DEGREES
CALCULATED T AXIS, ECG11: 38 DEGREES
DIAGNOSIS, 93000: NORMAL
P-R INTERVAL, ECG05: 178 MS
Q-T INTERVAL, ECG07: 352 MS
QRS DURATION, ECG06: 82 MS
QTC CALCULATION (BEZET), ECG08: 451 MS
VENTRICULAR RATE, ECG03: 99 BPM

## 2019-10-21 PROCEDURE — 93005 ELECTROCARDIOGRAM TRACING: CPT

## 2019-10-21 PROCEDURE — 71046 X-RAY EXAM CHEST 2 VIEWS: CPT

## 2019-10-21 NOTE — PATIENT INSTRUCTIONS
A Healthy Lifestyle: Care Instructions Your Care Instructions A healthy lifestyle can help you feel good, stay at a healthy weight, and have plenty of energy for both work and play. A healthy lifestyle is something you can share with your whole family. A healthy lifestyle also can lower your risk for serious health problems, such as high blood pressure, heart disease, and diabetes. You can follow a few steps listed below to improve your health and the health of your family. Follow-up care is a key part of your treatment and safety. Be sure to make and go to all appointments, and call your doctor if you are having problems. It's also a good idea to know your test results and keep a list of the medicines you take. How can you care for yourself at home? · Do not eat too much sugar, fat, or fast foods. You can still have dessert and treats now and then. The goal is moderation. · Start small to improve your eating habits. Pay attention to portion sizes, drink less juice and soda pop, and eat more fruits and vegetables. ? Eat a healthy amount of food. A 3-ounce serving of meat, for example, is about the size of a deck of cards. Fill the rest of your plate with vegetables and whole grains. ? Limit the amount of soda and sports drinks you have every day. Drink more water when you are thirsty. ? Eat at least 5 servings of fruits and vegetables every day. It may seem like a lot, but it is not hard to reach this goal. A serving or helping is 1 piece of fruit, 1 cup of vegetables, or 2 cups of leafy, raw vegetables. Have an apple or some carrot sticks as an afternoon snack instead of a candy bar. Try to have fruits and/or vegetables at every meal. 
· Make exercise part of your daily routine. You may want to start with simple activities, such as walking, bicycling, or slow swimming. Try to be active 30 to 60 minutes every day.  You do not need to do all 30 to 60 minutes all at once. For example, you can exercise 3 times a day for 10 or 20 minutes. Moderate exercise is safe for most people, but it is always a good idea to talk to your doctor before starting an exercise program. 
· Keep moving. [place] the lawn, work in the garden, or [place]. Take the stairs instead of the elevator at work. · If you smoke, quit. People who smoke have an increased risk for heart attack, stroke, cancer, and other lung illnesses. Quitting is hard, but there are ways to boost your chance of quitting tobacco for good. ? Use nicotine gum, patches, or lozenges. ? Ask your doctor about stop-smoking programs and medicines. ? Keep trying. In addition to reducing your risk of diseases in the future, you will notice some benefits soon after you stop using tobacco. If you have shortness of breath or asthma symptoms, they will likely get better within a few weeks after you quit. · Limit how much alcohol you drink. Moderate amounts of alcohol (up to 2 drinks a day for men, 1 drink a day for women) are okay. But drinking too much can lead to liver problems, high blood pressure, and other health problems. Family health If you have a family, there are many things you can do together to improve your health. · Eat meals together as a family as often as possible. · Eat healthy foods. This includes fruits, vegetables, lean meats and dairy, and whole grains. · Include your family in your fitness plan. Most people think of activities such as jogging or tennis as the way to fitness, but there are many ways you and your family can be more active. Anything that makes you breathe hard and gets your heart pumping is exercise. Here are some tips: 
? Walk to do errands or to take your child to school or the bus. 
? Go for a family bike ride after dinner instead of watching TV. Where can you learn more? Go to http://von-joanne.info/. Enter K436 in the search box to learn more about \"A Healthy Lifestyle: Care Instructions. \" Current as of: May 28, 2019 Content Version: 12.2 © 1366-1137 [employer]. Care instructions adapted under license by [employer] (which disclaims liability or warranty for this information). If you have questions about a medical condition or this instruction, always ask your healthcare professional. [address] any warranty or liability for your use of this information. Office Policies 
 
o Phone calls/patient messages: Please allow up to 24 hours for someone in the office to contact you about your call or message. Be mindful your provider may be out of the office or your message may require further review. We encourage you to use [employer] for your messages as this is a faster, more efficient way to communicate with our office 
 
o Medication Refills: 
Prescription medications require up to 48 business hours to process. We encourage you to use [employer] for your refills. For controlled medications: Please allow up to 72 business hours to process. Certain medications may require you to  a written prescription at our office. NO narcotic/controlled medications will be prescribed after 4pm Monday through Friday or on weekends 
 
o Form/Paperwork Completion: We ask that you allow 7-14 business days. You may also download your forms to [employer] to have your doctor print off.

## 2019-10-21 NOTE — PROGRESS NOTES
Consult    Subjective:     Adrienne Sethi is a 48 y. o.right-handed  male seen today for urgent work in evaluation at the request of Dr. Mariola Lopez of new problem of recurring episodes of lightheadedness and dizziness, point that last week the patient while at work stood up around 3 AM apparently then passed out about 5 to 10 minutes, hit his head, but no major other injury, and because of these episodes he is seen today urgently to further evaluate his neurologic status. His dizziness only happens when he stands up, and is not associated with any unusual palpitations, but on exam today he does have an irregular pulse, tachycardia, and is complaining of constant short of breath in addition he with exertional tolerance, so we will refer him to cardiology because of his recurring syncope, and increasing cardiovascular symptoms to make sure that he is not in heart failure, having some type of arrhythmia, or other cause for his syncope, difficulty breathing and shortness of breath. He is never had a clear history of heart disease prior. Blood pressure goes from 130/80 down to 120/70 but does not drop much more than that on standing. He says his Parkinson's disease is actually doing fairly well and he is happy with his control taking Gocoveri 2 pills each night. In addition he is on Sinemet 1/2 tablets on average about 3 times a day and was able to decrease that from 5 times a day previously. Is also on Requip 1 mg 3 times a day and Artane 1 tablet by mouth 4 times a day. Patient is also on vitamin D 50,000 units once a week and takes a One-A-Day vitamin also. He is still able to work part-time. His dyskinesias are markedly improved. He had to stop the Eldepryl because it was too expensive. Patient could not afford the Neupro patches anymore and wanted to switch back to Requip. The patches worked very well, but he just cannot afford them.   I asked him to continue his Artane at bid each day but he says he needs it 3 times a day. He thought Mirapex made him sick. His back is gotten somewhat better with physical therapy. He is limited in his finances and I am not sure he will be able to afford Rytary. He has had no clear focal weakness, and only migratory sensory loss in his leg. He is still able to work some, and states that he thinks he might be getting slowly better and wants to wait before any diagnostic testing done. His bowel and bladder functions normal and he has no radicular symptoms into the left leg. He had mild dyskinesia but is moving well today. He most likely had end of dose dystonia. Patient has a 17 year history of Parkinson's disease, and a problem with neck pain and tightness in his neck and headaches in the posterior head regions. He seems to show restrictive range of motion particularly to the right on exam suggesting degenerative arthritis of his neck, and cervical spine x-rays do show some mild degenerative changes at C5-6. He is tolerating the medication well, and has not noticed any side effects. He's had no new weakness or sensory loss or visual changes cognitive issues or gait problems. He is taking his vitamins regularly and we did advise him he needs to do that and his vitamin D. He could not afford Azilect or the Neupro patch. His complete review of systems and symptoms was negative for any other new medical problems, complications or illnesses. He has a history of Parkinson's disease, kidney stones, neck pain and cervical spondylosis and history of vein stripping.     Past Medical History:   Diagnosis Date    Kidney disease     Movement disorder     Nephrolithiasis     Neurological disorder     Other ill-defined conditions(799.89)     Parkinson disease    Parkinson disease ([facility] [address]       Past Surgical History:   Procedure Laterality Date    HX VEIN STRIPPING      HX WISDOM TEETH EXTRACTION       Family History   Problem Relation Age of Onset    Hypertension Mother    [facility] Heart Disease Mother     Parkinsonism Father     Arthritis-osteo Father     Seizures Sister       Social History     Tobacco Use    Smoking status: Never Smoker    Smokeless tobacco: Never Used   Substance Use Topics    Alcohol use: Yes     Comment: rare       Current Outpatient Medications   Medication Sig Dispense Refill    ergocalciferol (ERGOCALCIFEROL) 50,000 unit capsule Take 1 Cap by mouth every seven (7) days. 12 Cap 0    carbidopa-levodopa (SINEMET)  mg per tablet TAKE 1.5 TABLETS BY MOUTH 5 TIMES DAILY 675 Tab 5    rOPINIRole (REQUIP) 1 mg tablet Take 1 Tab by mouth three (3) times daily. 270 Tab 5    trihexyphenidyl (ARTANE) 2 mg tablet Take 1 Tab by mouth four (4) times daily. 360 Tab 5    amantadine HCl (GOCOVRI) 137 mg cp24 Take 2 Caps by mouth nightly. 60 Cap 11    multivitamins-minerals-lutein (CENTRUM SILVER) Tab Take  by mouth. No Known Allergies     Review of Systems:  A comprehensive review of systems was negative except for: Neurological: positive for coordination problems, gait problems and tremor     Objective:     I      NEUROLOGICAL EXAM:    Appearance: The patient is well developed, well nourished, provides a coherent history and is in no acute distress. Patient had minimal dyskinesias. Mental Status: Oriented to time, place and person and the president, cognitive function and fund of knowledge seemed normal though he is a little slow mentally. Keila Hutchison Speech is fluent without aphasia or dysarthria. Mood and affect appropriate, but anxious and depressed. Cranial Nerves:   Intact visual fields. Fundi are benign, discs are flat. JUAN M, EOM's full, no nystagmus, no ptosis. Facial sensation is normal. Corneal reflexes are not tested. Facial movement is symmetric, but the patient has mild bilateral facial dyskinesias. Hearing is abnormal bilaterally. Palate is midline with normal sternocleidomastoid and trapezius muscles are normal. Tongue is midline.   Neck without meningismus or bruits  Temporal arteries are not tender or enlarged   Motor:  5/5 strength in upper and lower proximal and distal muscles. Normal bulk and mild increased tone with cogwheel rigidity and bradykinesia in both upper extremities right side slightly greater than the left. No fasciculations. Patient has minimal dyskinesias of the face and extremities bilaterally. Patient has mild decreased range of motion of the neck on exam particularly to the right, with some tenderness of the cervical muscles. Reflexes:   Deep tendon reflexes 2+/4 and symmetrical. No Babinski or clonus present. Patient has negative straight-leg raising test in the sitting position bilaterally  Patient has mild percussion tenderness over the lower lumbar spine  Patient can almost bend over and touch his toes   Sensory:   Normal to touch, pinprick and vibration and temperature and DSS, except for mild decreased temperature and vibration in both feet. Gait:  Abnormal gait for slight dyskinesia in his arms and legs and slightly decreased arm swing in his right upper extremity. Tremor:    Mild resting tremor noted in all extremities. Cerebellar:   Mildly abnormal Romberg and tandem cerebellar signs present. Finger-nose-finger examination shows no dysmetria   Neurovascular:  Normal heart sounds and regular rhythm, peripheral pulses decreased in his feet, and no carotid bruits. Assessment:         Plan:     Patient with new problem of recurring syncope, lightheadedness and dizziness, associate with increasing chest pressure, shortness of breath, dyspnea on exertion, and some swelling in his ankles, and he was referred to cardiology to make sure his not going into heart failure, does not have a cardiomyopathy, and does not have a cardiac arrhythmia for his syncope.   We will check an ambulatory 24-hour EEG recording and a carotid Doppler study, but he has no new focal weakness no sensory loss no history of seizures at all, tongue biting no incontinence, and I really think this is more vasovagal.  Patient encouraged to wear support hose to the knee level to help prevent. To make sure that he drinks Gatorade or Powerade twice a day at nighttime keeping hydrated. Routine metabolic studies checked today also. Patient wants to discuss other alternative medications, and we discussed the new nasal spray Sinemet, and after long discussion I explained why it was not a good medicine for him at this time, but may be in the future. We discussed other medications and I spent a long time today 20 minutes of the 35 minute visit counseling the patient on all of this. Patient with problem of persistent dyskinesias but much better on his medication now,, will continue Gocovri for the patient today to help with his Parkinson's and dyskinesias which seems to help some  His medications were all renewed for him today and sent into the pharmacy for him. Side effects of the medications were explained to the patient including drowsiness, confusion, agitation, or any side effect is to call us immediately. Patient with increasing problems of being able to afford his medications,   Parkinson's disease with increasing morning end of dose dystonia,   Because of his increasing ataxia and unsteady gait we tried unsuccessfully to taper him on Artane he will continue his dose 4 times a day now  Cervical spondylosis with muscle contraction headaches  Lumbar radiculopathy into the right leg probably secondary to degenerative disc disease  Patient is to make sure he does his exercises for cervical spondylosis and lumbar spinal stenosis  Patient is to continue his neck exercises, and advised to take anti-inflammatories as needed and continue a regular exercise program.  Patient is to continue his current dose of other medications.    He's doing a little worse with his gait and dyskinesias  He is to remain mentally and physically active and start taking vitamins and vitamin D more regularly and try to exercise more. He'll be seen again in 6 months time or earlier if needed  Time of visit was 35 minutes today, counseling the patient, going over his medications, discussing therapeutic options with him in detail. Signed By: Estrellita Bowman MD     October 21, 2019       This note will not be viewable in [address]

## 2019-10-23 ENCOUNTER — DOCUMENTATION ONLY (OUTPATIENT)
Dept: NEUROLOGY | Age: 54
End: 2019-10-23

## 2019-10-28 ENCOUNTER — HOSPITAL ENCOUNTER (OUTPATIENT)
Dept: NEUROLOGY | Age: 54
Discharge: HOME | End: 2019-10-28
Attending: PSYCHIATRY & NEUROLOGY
Payer: MEDICARE

## 2019-10-28 DIAGNOSIS — G20 DYSKINESIA DUE TO PARKINSON'S DISEASE (HCC): ICD-10-CM

## 2019-10-28 DIAGNOSIS — G20 PARKINSON DISEASE (HCC): ICD-10-CM

## 2019-10-28 DIAGNOSIS — M54.40 BACK PAIN OF LUMBAR REGION WITH SCIATICA: ICD-10-CM

## 2019-10-28 DIAGNOSIS — G24.9 DYSKINESIA DUE TO PARKINSON'S DISEASE (HCC): ICD-10-CM

## 2019-10-28 DIAGNOSIS — R55 SYNCOPE AND COLLAPSE: ICD-10-CM

## 2019-10-28 DIAGNOSIS — R41.82 ALTERED MENTAL STATUS, UNSPECIFIED ALTERED MENTAL STATUS TYPE: ICD-10-CM

## 2019-10-28 DIAGNOSIS — R55 CONVULSIVE SYNCOPE: ICD-10-CM

## 2019-10-28 DIAGNOSIS — M54.16 LUMBAR BACK PAIN WITH RADICULOPATHY AFFECTING LEFT LOWER EXTREMITY: ICD-10-CM

## 2019-10-28 PROCEDURE — 95953 NEURO EEG 24 HR: CPT

## 2019-10-30 PROBLEM — R55 CONVULSIVE SYNCOPE: Status: ACTIVE | Noted: 2019-10-30

## 2019-10-30 PROBLEM — R41.82 ALTERED MENTAL STATUS, UNSPECIFIED: Status: ACTIVE | Noted: 2019-10-30

## 2019-10-31 NOTE — PROCEDURES
[facility] [place] [facility]  EEG    Name:  Kaitlin Mae  MR#:  [id number]  :  1965  ACCOUNT #:  [de-identified]  DATE OF SERVICE:  10/28/2019    AMBULATORY 24-HOUR EEG RECORDING    DATE OF STUDY:  10/28/2019 to 10/29/2019. CLINICAL HISTORY:  The patient is a 79-year-old male with a history of sudden confusion, increased marked tremor spells. The patient with altered mental status. The patient with recurrent episodes of dizziness, lightheadedness, nearly passing out. EEG to rule out partial complex seizures, rule out encephalopathy. EEG classification on this patient is essentially normal, ambulatory 24-hour EEG recording. DESCRIPTION OF THE RECORD:  This is a 16-channel prolonged 24-hour EEG recording on the patient to rule out seizures, and states to have recurrent spells of near-syncope, altered mental status. The patient had a posteriorly located occipital alpha rhythm of 9-10 Hz that did attenuate with eye opening. During this recording, the patient did develop some artifact in all the EEG around 22 hours into recording and it was probably that the electrodes were detached. So, the 22 hours of meaningful recording made during this time. The patient also had some electrode artifact that began in the F8 and F4 electrodes approximately 3-1/2 hours into recording that somewhat compromised the frontal temporal electrodes throughout the most of the recording after about 4 hours. The rest of the study was technically recording for the right frontal artifact. There were no areas of focal slowing, no other spike or spike-wave discharges. No recorded spells of any type. On the patient's diary, there were no clinical spells of significant loss as described. The patient in the evening hours entered the prolonged state of sleep with K-complexes and sleep spindles seen in central head regions.     INTERPRETATION:  This is an essentially normal electroencephalogram in a patient with prolonged EEG monitoring for 25 hours. This study began on 10/28/2019 at 11:15 a.m. and ended on 10/29/2019 at approximately 12:46 p.m. for a total time of 24-1/2 hours total time of recording. During this recording, there was 22 hours of good recording except for the artifact in the right frontal region as noted above. Clinical correlation recommended.         Michelle Collado MD      [email or web address]  D:  10/30/2019 21:29  T:  10/31/2019 5:18  JOB #:  [id number]

## 2019-11-07 ENCOUNTER — TELEPHONE (OUTPATIENT)
Dept: NEUROLOGY | Age: 54
End: 2019-11-07

## 2019-11-07 NOTE — TELEPHONE ENCOUNTER
----- Message from Jenna Tompkins sent at 11/7/2019  1:30 PM EST -----  Regarding: Dr Bryan/telephone  Pt is calling to get Test Results, this is his 2nd call, please call pt at [phone].

## 2019-11-20 ENCOUNTER — OFFICE VISIT (OUTPATIENT)
Dept: CARDIOLOGY CLINIC | Age: 54
End: 2019-11-20

## 2019-11-20 VITALS
RESPIRATION RATE: 16 BRPM | HEART RATE: 91 BPM | SYSTOLIC BLOOD PRESSURE: 110 MMHG | WEIGHT: 234.3 LBS | OXYGEN SATURATION: 97 % | BODY MASS INDEX: 31.05 KG/M2 | DIASTOLIC BLOOD PRESSURE: 76 MMHG | HEIGHT: 73 IN

## 2019-11-20 DIAGNOSIS — I65.23 BILATERAL CAROTID ARTERY STENOSIS: ICD-10-CM

## 2019-11-20 DIAGNOSIS — G20 PARKINSON'S DISEASE (HCC): ICD-10-CM

## 2019-11-20 DIAGNOSIS — R55 SYNCOPE AND COLLAPSE: Primary | ICD-10-CM

## 2019-11-20 DIAGNOSIS — R06.09 DOE (DYSPNEA ON EXERTION): ICD-10-CM

## 2019-11-20 RX ORDER — GLUCOSAMINE SULFATE 1500 MG
POWDER IN PACKET (EA) ORAL DAILY
COMMUNITY
End: 2020-07-07 | Stop reason: ALTCHOICE

## 2019-11-20 NOTE — PROGRESS NOTES
[address] [facility], [facility] [employer], [address]  [phone]     Subjective:      Holly Harley is a 48 y.o. male with pmhx parkinson's disease is here to establish care and for further evaluation of syncopal episode which occurred at least 3 wks ago while he was at work. Per pt he has intermittent lightheadedness, doesn't last, has been going on for years,  Then that the day, while he was at work,  He was walking then got lightheaded and just passed out for about a couple of minutes. He then stood up and sat for the rest  Of the night. No further syncopal episode  He does report kovacs when he walks up steps or if he walks a long way, worse than last year    Family hx CAD:  Mother has CAD and aortic stenosis  Doesn't smoke/drink alcohol      The patient denies chest pain, orthopnea, PND, LE edema, palpitations. Excerpted HPI from H&P of Dr Macarena Hutton 10/21/19  is a 48 y. o.right-handed  male seen today for urgent work in evaluation at the request of Dr. Evy Ramírez of new problem of recurring episodes of lightheadedness and dizziness, point that last week the patient while at work stood up around 3 AM apparently then passed out about 5 to 10 minutes, hit his head, but no major other injury, and because of these episodes he is seen today urgently to further evaluate his neurologic status. His dizziness only happens when he stands up, and is not associated with any unusual palpitations, but on exam today he does have an irregular pulse, tachycardia, and is complaining of constant short of breath in addition he with exertional tolerance, so we will refer him to cardiology because of his recurring syncope, and increasing cardiovascular symptoms to make sure that he is not in heart failure, having some type of arrhythmia, or other cause for his syncope, difficulty breathing and shortness of breath. He is never had a clear history of heart disease prior.   Blood pressure goes from 130/80 down to 120/70 but does not drop much more than that on standing.     Patient Active Problem List    Diagnosis Date Noted    Convulsive syncope 10/30/2019    Altered mental status, unspecified 10/30/2019    Syncope and collapse 10/21/2019    Bilateral carotid artery stenosis 10/21/2019    Lumbar back pain with radiculopathy affecting left lower extremity 05/17/2016    Back pain of lumbar region with sciatica 05/17/2016    Dyskinesia due to Parkinson's disease ([facility] [address] 04/10/2015    Cervical spondylosis without myelopathy 08/28/2013    Neck pain 08/28/2013    Nephrolithiasis     Parkinson's disease ([address] 11/10/2010    Spondylisthesis 11/10/2010      Aman Lorenzo NP  Past Medical History:   Diagnosis Date    Kidney disease     Movement disorder     Nephrolithiasis     Neurological disorder     Other ill-defined conditions(799.89)     Parkinson disease    Parkinson disease ([address]       Past Surgical History:   Procedure Laterality Date    HX VEIN STRIPPING      HX WISDOM TEETH EXTRACTION       No Known Allergies   Family History   Problem Relation Age of Onset    Hypertension Mother     Heart Disease Mother     Parkinsonism Father     Arthritis-osteo Father     Seizures Sister       Social History     Socioeconomic History    Marital status:      Spouse name: Not on file    Number of children: Not on file    Years of education: Not on file    Highest education level: Not on file   Occupational History    Not on file   Social Needs    Financial resource strain: Not on file    Food insecurity:     Worry: Not on file     Inability: Not on file    Transportation needs:     Medical: Not on file     Non-medical: Not on file   Tobacco Use    Smoking status: Never Smoker    Smokeless tobacco: Never Used   Substance and Sexual Activity    Alcohol use: Yes     Comment: rare    Drug use: No    Sexual activity: Not Currently   Lifestyle    Physical activity:     Days per week: Not on file     Minutes per session: Not on file    Stress: Not on file   Relationships    Social connections:     Talks on phone: Not on file     Gets together: Not on file     Attends Taoist service: Not on file     Active member of club or organization: Not on file     Attends meetings of clubs or organizations: Not on file     Relationship status: Not on file    Intimate partner violence:     Fear of current or ex partner: Not on file     Emotionally abused: Not on file     Physically abused: Not on file     Forced sexual activity: Not on file   Other Topics Concern    Not on file   Social History Narrative    Not on file      Current Outpatient Medications   Medication Sig    cholecalciferol (VITAMIN D3) 1,000 unit cap Take  by mouth daily.  carbidopa-levodopa (SINEMET)  mg per tablet TAKE 1.5 TABLETS BY MOUTH 5 TIMES DAILY    rOPINIRole (REQUIP) 1 mg tablet Take 1 Tab by mouth three (3) times daily.  trihexyphenidyl (ARTANE) 2 mg tablet Take 1 Tab by mouth four (4) times daily. (Patient taking differently: Take 2 mg by mouth three (3) times daily.)    amantadine HCl (GOCOVRI) 137 mg cp24 Take 2 Caps by mouth nightly. No current facility-administered medications for this visit. Review of Symptoms:  11 systems reviewed, negative other than as stated in the HPI    Physical ExamPhysical Exam:    Vitals:    11/20/19 1500 11/20/19 1511 11/20/19 1512   BP: 122/80 120/80 110/76   Pulse: 91     Resp: 16     SpO2: 97%     Weight: 234 lb 4.8 oz (106.3 kg)     Height: 6' 1\" (1.854 m)       Body mass index is 30.91 kg/m². General PE  Gen:  NAD  Mental Status - Alert. General Appearance - Not in acute distress. HEENT:  PERRL, no carotid bruits or JVD  Chest and Lung Exam   Inspection: Accessory muscles - No use of accessory muscles in breathing.    Auscultation:   Breath sounds: - Normal.   Cardiovascular   Inspection: Jugular vein - Bilateral - Inspection Normal. Palpation/Percussion:   Apical Impulse: - Normal.   Auscultation: Rhythm - Regular. Heart Sounds - S1 WNL and S2 WNL. No S3 or S4. Murmurs & Other Heart Sounds: Auscultation of the heart reveals - No Murmurs. Peripheral Vascular   Upper Extremity: Inspection - Bilateral - No Cyanotic nailbeds or Digital clubbing. Lower Extremity:   Palpation: Edema - Bilateral - chronic venous stasis---hx vein stripping  Abdomen:   Soft, non-tender, bowel sounds are active. Neuro: A&O times 3, CN and motor grossly WNL    Labs:   Lab Results   Component Value Date/Time    Cholesterol, total 160 05/21/2019 08:23 AM    HDL Cholesterol 49 05/21/2019 08:23 AM    LDL, calculated 100 05/21/2019 08:23 AM    Triglyceride 53 05/21/2019 08:23 AM    CHOL/HDL Ratio 3 05/21/2019 08:23 AM     No results found for: CPK, CPKX, CPX  Lab Results   Component Value Date/Time    Sodium 143 05/21/2019 08:23 AM    Potassium 4.3 05/21/2019 08:23 AM    Chloride 104 05/21/2019 08:23 AM    CO2 23.0 05/21/2019 08:23 AM    Anion gap 16 05/21/2019 08:23 AM    Glucose 90 05/21/2019 08:23 AM    BUN 20.0 05/21/2019 08:23 AM    Creatinine 1.1 05/21/2019 08:23 AM    BUN/Creatinine ratio 18 05/21/2019 08:23 AM    GFR est AA >60 05/21/2019 08:23 AM    GFR est non-AA >60 05/21/2019 08:23 AM    Calcium 9.6 05/21/2019 08:23 AM    Bilirubin, total 1.7 (H) 05/21/2019 08:23 AM    AST (SGOT) 15.0 05/21/2019 08:23 AM    Alk. phosphatase 89 05/21/2019 08:23 AM    Protein, total 7.5 05/21/2019 08:23 AM    Albumin 4.5 05/21/2019 08:23 AM    Globulin 3.00 05/21/2019 08:23 AM    A-G Ratio 1.5 05/21/2019 08:23 AM    ALT (SGPT) 10 05/21/2019 08:23 AM       EKG:  SR     Assessment:     Assessment:      1. Syncope and collapse    2. Bilateral carotid artery stenosis    3. Parkinson's disease ([address]    4.  CHE (dyspnea on exertion)        Orders Placed This Encounter    LOOP MONITOR, Clinic Performed     1 month     Standing Status:   Future     Standing Expiration Date: 5/20/2020     Order Specific Question:   Reason for Exam:     Answer:   syncope    AMB POC EKG ROUTINE W/ 12 LEADS, INTER & REP     Order Specific Question:   Reason for Exam:     Answer:   ROUTINE    cholecalciferol (VITAMIN D3) 1,000 unit cap     Sig: Take  by mouth daily. Plan: This  is a 48 y.o. male with pmhx parkinson's disease is here to establish care and for further evaluation of syncopal episode which occurred at least 3 wks ago while he was at work. Per pt he has intermittent lightheadedness, doesn't last, has been going on for years,  Then that the day, while he was at work,  He was walking then got lightheaded and just passed out for about a couple of minutes. He then stood up and sat for the rest  Of the night. No further syncopal episode  He does report che when he walks up steps or if he walks a long way, worse than last year      Family hx CAD:  Mother has CAD and aortic stenosis  Doesn't smoke/drink alcohol    Syncope  Mild carotid artery disease per carotid duplex 10/19  Neuro also following---normal EEG  nonorthostatic in clinic. EKG SR  Advised to monitor orthostatics at home, liberalize salt and fluid intake if blood pressures less than 110 and symptomatic, advise us if systolic drops by greater than 20 points. Check echo  Check event --- 2 week  CHE  Normal CXR 10/19  Obtain echo as above  Obtain stress echo    Bp controlled     Parkinson's Disease  Followed by Dr Meredith Petersen      5/19 LDL at 100. Lipids and labs followed by PCP. Noted chronic venous stasis  Hx vein stripping  Denies any leg symptoms      Follow-up to be determined based on test results.          Jes Robles MD

## 2019-11-20 NOTE — PROGRESS NOTES
1. Have you been to the ER, urgent care clinic since your last visit? Hospitalized since your last visit? NO    2. Have you seen or consulted any other health care providers outside of the [address] [place] [address] since your last visit? Include any pap smears or colon screening. NO    NEW PATIENT. C/O DIZZINESS, SOB WHEN WALKING UP AND DOWN STEPS.

## 2019-12-04 ENCOUNTER — CLINICAL SUPPORT (OUTPATIENT)
Dept: CARDIOLOGY CLINIC | Age: 54
End: 2019-12-04

## 2019-12-04 DIAGNOSIS — R55 SYNCOPE AND COLLAPSE: Primary | ICD-10-CM

## 2019-12-10 ENCOUNTER — TELEPHONE (OUTPATIENT)
Dept: CARDIOLOGY CLINIC | Age: 54
End: 2019-12-10

## 2019-12-10 NOTE — TELEPHONE ENCOUNTER
----- Message from Jie Mcfarlane NP sent at 12/9/2019  5:44 PM EST -----  Echo showed normal pumping heart strength, mild leakage in his mitral valve which we will eventually reevaluate with  Repeat echocardiogram at some point in the next 2-3 yrs.   Over all, his heart valves are good --- nothing to cause his syncopal episode at work  Will await event monitor and stress test.

## 2019-12-13 ENCOUNTER — TELEPHONE (OUTPATIENT)
Dept: CARDIOLOGY CLINIC | Age: 54
End: 2019-12-13

## 2019-12-13 NOTE — TELEPHONE ENCOUNTER
Called patient to confirm Nuclear stress test for 12/17. Reviewed with patient the need to hold all caffeine containing food, beverages and medicines for 24 hours. Pt verbalized understanding.

## 2019-12-18 ENCOUNTER — TELEPHONE (OUTPATIENT)
Dept: CARDIOLOGY CLINIC | Age: 54
End: 2019-12-18

## 2019-12-18 NOTE — TELEPHONE ENCOUNTER
----- Message from Maia Saha NP sent at 12/18/2019 12:36 PM EST -----  Jeremias Srinivasan,    Please call patient. Will need to keep f/u with Dr. Jayshree Partida to discuss results of stress test and echo.     Thanks,  [employer]

## 2019-12-20 ENCOUNTER — OFFICE VISIT (OUTPATIENT)
Dept: CARDIOLOGY CLINIC | Age: 54
End: 2019-12-20

## 2019-12-20 VITALS
WEIGHT: 234 LBS | OXYGEN SATURATION: 98 % | SYSTOLIC BLOOD PRESSURE: 132 MMHG | HEART RATE: 70 BPM | BODY MASS INDEX: 31.01 KG/M2 | HEIGHT: 73 IN | DIASTOLIC BLOOD PRESSURE: 86 MMHG

## 2019-12-20 DIAGNOSIS — R55 SYNCOPE AND COLLAPSE: ICD-10-CM

## 2019-12-20 DIAGNOSIS — R06.09 DOE (DYSPNEA ON EXERTION): ICD-10-CM

## 2019-12-20 DIAGNOSIS — R42 DIZZINESS: Primary | ICD-10-CM

## 2019-12-20 DIAGNOSIS — I87.2 VENOUS INSUFFICIENCY: ICD-10-CM

## 2019-12-20 DIAGNOSIS — G20 PARKINSON'S DISEASE (HCC): ICD-10-CM

## 2019-12-20 NOTE — PROGRESS NOTES
[facility] @ [place], [place]  Subjective/HPI:     Perla Grullon is a 48 y.o. male is here for test result follow up. Test result follow-up. To recall patient was referred by Sandford Koyanagi with neurology as patient has a history of Parkinson's, had dizziness and syncopal episode and reporting elevated heart rates and dyspnea on exertion cardiology was consulted. Results as posted below fixed defect in the myocardium infarct versus attenuation artifact, echocardiogram showing ejection fraction 58% grade 1 diastolic dysfunction. Returned monitor a few days ago, patient reports only one alert triggered from shortness of breath, complete download not available at this time. During the event monitor. Denies any lightheadedness dizziness syncope or presyncopal feelings. He has some venous stasis with 1+ lower extremity edema that is chronic and mildly bothersome. Nuclear stress Test  Interpretation Summary     · Baseline ECG: Sinus rhythm, PVCs. · Gated SPECT: Left ventricular function post-stress was abnormal. Calculated ejection fraction is 40%. There is no evidence of transient ischemic dilation (TID). · Left ventricular perfusion is probably normal.  · Myocardial perfusion imaging defect 1: There is a defect that is small in size present in the inferior location(s) that is predominantly fixed. There is abnormal wall motion in the defect area. The defect appears to probably be artifact. The possibility of infarction cannot be excluded. · Abnormal myocardial perfusion imaging. Fixed defect consistent with prior myocardial infarction. Myocardial perfusion imaging supports an intermediate risk stress test.          ECHO  · Left Ventricle: Normal cavity size and wall thickness. Low normal systolic dysfunction. Estimated left ventricular ejection fraction is 51 - 55%. Mild (grade 1) left ventricular diastolic dysfunction.   · Mitral Valve: Mild mitral valve regurgitation is present. · Tricuspid Valve: Mild tricuspid valve regurgitation is present. · Pulmonary Artery: Mild pulmonary hypertension. PCP Provider  Jesus Head NP  Past Medical History:   Diagnosis Date    Kidney disease     Movement disorder     Nephrolithiasis     Neurological disorder     Other ill-defined conditions(799.89)     Parkinson disease    Parkinson disease ([address]       Past Surgical History:   Procedure Laterality Date    HX VEIN STRIPPING      HX WISDOM TEETH EXTRACTION       No Known Allergies   Family History   Problem Relation Age of Onset    Hypertension Mother     Heart Disease Mother     Parkinsonism Father     Arthritis-osteo Father     Seizures Sister       Current Outpatient Medications   Medication Sig    cholecalciferol (VITAMIN D3) 1,000 unit cap Take  by mouth daily.  carbidopa-levodopa (SINEMET)  mg per tablet TAKE 1.5 TABLETS BY MOUTH 5 TIMES DAILY    rOPINIRole (REQUIP) 1 mg tablet Take 1 Tab by mouth three (3) times daily.  trihexyphenidyl (ARTANE) 2 mg tablet Take 1 Tab by mouth four (4) times daily. (Patient taking differently: Take 2 mg by mouth three (3) times daily.)    amantadine HCl (GOCOVRI) 137 mg cp24 Take 2 Caps by mouth nightly. No current facility-administered medications for this visit.        Vitals:    12/20/19 1358 12/20/19 1404   BP: 138/88 132/86   Pulse: 70    SpO2: 98%    Weight: 234 lb (106.1 kg)    Height: 6' 1\" (1.854 m)      Social History     Socioeconomic History    Marital status:      Spouse name: Not on file    Number of children: Not on file    Years of education: Not on file    Highest education level: Not on file   Occupational History    Not on file   Social Needs    Financial resource strain: Not on file    Food insecurity:     Worry: Not on file     Inability: Not on file    Transportation needs:     Medical: Not on file     Non-medical: Not on file   Tobacco Use    Smoking status: Never Smoker    Smokeless tobacco: Never Used   Substance and Sexual Activity    Alcohol use: Yes     Comment: rare    Drug use: No    Sexual activity: Not Currently   Lifestyle    Physical activity:     Days per week: Not on file     Minutes per session: Not on file    Stress: Not on file   Relationships    Social connections:     Talks on phone: Not on file     Gets together: Not on file     Attends Scientologist service: Not on file     Active member of club or organization: Not on file     Attends meetings of clubs or organizations: Not on file     Relationship status: Not on file    Intimate partner violence:     Fear of current or ex partner: Not on file     Emotionally abused: Not on file     Physically abused: Not on file     Forced sexual activity: Not on file   Other Topics Concern    Not on file   Social History Narrative    Not on file       I have reviewed the nurses notes, vitals, problem list, allergy list, medical history, family, social history and medications. Review of Symptoms  11 systems reviewed, negative other than as stated in the HPI      Physical Exam:      General: Well developed, in no acute distress, cooperative and alert  HEENT: No carotid bruits, no JVD, trach is midline. Neck Supple, PERRL, EOM intact. Heart:  Normal S1/S2 negative S3 or S4. Regular, no murmur, gallop or rub. Respiratory: Clear bilaterally x 4, no wheezing or rales  Abdomen:   Soft, non-tender, no masses, bowel sounds are active. Extremities:  No edema, normal cap refill, no cyanosis, atraumatic. Neuro: A&Ox3, speech clear, bilateral tremor right > left    Skin: Skin color is normal. No rashes or lesions. Non diaphoretic. Vascular: 2+ pulses symmetric bilateral radials and bilateral posterior tibialis. Marked ropelike lower extremity varicosities, significant skin darkening, integrity is intact.     Cardiographics    ECG:         Cardiology Labs:  Lab Results   Component Value Date/Time    Cholesterol, total 160 05/21/2019 08:23 AM    HDL Cholesterol 49 05/21/2019 08:23 AM    LDL, calculated 100 05/21/2019 08:23 AM    Triglyceride 53 05/21/2019 08:23 AM    CHOL/HDL Ratio 3 05/21/2019 08:23 AM       Lab Results   Component Value Date/Time    Sodium 143 05/21/2019 08:23 AM    Potassium 4.3 05/21/2019 08:23 AM    Chloride 104 05/21/2019 08:23 AM    CO2 23.0 05/21/2019 08:23 AM    Anion gap 16 05/21/2019 08:23 AM    Glucose 90 05/21/2019 08:23 AM    BUN 20.0 05/21/2019 08:23 AM    Creatinine 1.1 05/21/2019 08:23 AM    BUN/Creatinine ratio 18 05/21/2019 08:23 AM    GFR est AA >60 05/21/2019 08:23 AM    GFR est non-AA >60 05/21/2019 08:23 AM    Calcium 9.6 05/21/2019 08:23 AM    Bilirubin, total 1.7 (H) 05/21/2019 08:23 AM    AST (SGOT) 15.0 05/21/2019 08:23 AM    Alk. phosphatase 89 05/21/2019 08:23 AM    Protein, total 7.5 05/21/2019 08:23 AM    Albumin 4.5 05/21/2019 08:23 AM    Globulin 3.00 05/21/2019 08:23 AM    A-G Ratio 1.5 05/21/2019 08:23 AM    ALT (SGPT) 10 05/21/2019 08:23 AM           Assessment:     Assessment:     Diagnoses and all orders for this visit:    1. Dizziness  -     REFERRAL TO CARDIOLOGY    2. CHE (dyspnea on exertion)  -     REFERRAL TO CARDIOLOGY    3. Venous insufficiency    4. Syncope and collapse    5. Parkinson's disease ([facility] [address]        ICD-10-CM ICD-9-CM    1. Dizziness R42 780.4 REFERRAL TO CARDIOLOGY   2. CHE (dyspnea on exertion) R06.09 786.09 REFERRAL TO CARDIOLOGY   3. Venous insufficiency I87.2 459.81    4. Syncope and collapse R55 780.2    5. Parkinson's disease ([facility] [address] G20 332.0      Orders Placed This Encounter    Isael Card [facility]     Referral Priority:   Routine     Referral Type:   Consultation     Referral Reason:   Specialty Services Required     Referred to Provider:   Don Velasquez MD     Number of Visits Requested:   1        Plan:     1.   Dizziness: No recurrence, structurally normal heart on echocardiogram, no definitive ischemia on stress test appears more attenuation artifact. Awaiting the results of complete download of event monitor. Presuming event monitor is negative for cause, this was likely vasovagal syncope without a clear stimulus. 2.  Bilateral lower extremity varicosities/suspected venous insufficiency with significant dermal darkening consistent with venous pathology. Will have patient get lower extremity venous reflux study and if abnormal follow-up for vascular consult with Dr. Gila Leonard. 3.  Counseled on diet and exercise- eventual goal of 30-60 minutes 5-7 times a week as per AHA guidelines. Follow up in 1 year, sooner as needed. Jamin Willingham MD      Please note that this dictation was completed with Oony, the computer voice recognition software. Quite often unanticipated grammatical, syntax, homophones, and other interpretive errors are inadvertently transcribed by the computer software. Please disregard these errors. Please excuse any errors that have escaped final proofreading. Thank you.

## 2019-12-20 NOTE — PROGRESS NOTES
1. Have you been to the ER, urgent care clinic since your last visit? Hospitalized since your last visit? No    2. Have you seen or consulted any other health care providers outside of the [address] [place] [address] since your last visit? Include any pap smears or colon screening.  No

## 2019-12-20 NOTE — LETTER
12/20/19 Patient: Mellie Denver YOB: 1965 Date of Visit: 12/20/2019 DAVID [address] [address] VIA In Basket Dear Santa Easton NP, Thank you for referring Mr. Jade Moon to [facility] for evaluation. My notes for this consultation are attached. If you have questions, please do not hesitate to call me. I look forward to following your patient along with you.  
 
 
Sincerely, 
 
Marimar Rao MD

## 2019-12-27 ENCOUNTER — APPOINTMENT (OUTPATIENT)
Dept: CT IMAGING | Age: 54
End: 2019-12-27
Attending: EMERGENCY MEDICINE
Payer: MEDICARE

## 2019-12-27 ENCOUNTER — HOSPITAL ENCOUNTER (EMERGENCY)
Age: 54
Discharge: HOME | End: 2019-12-27
Attending: EMERGENCY MEDICINE
Payer: MEDICARE

## 2019-12-27 ENCOUNTER — DOCUMENTATION ONLY (OUTPATIENT)
Dept: CARDIOLOGY CLINIC | Age: 54
End: 2019-12-27

## 2019-12-27 VITALS
DIASTOLIC BLOOD PRESSURE: 63 MMHG | TEMPERATURE: 97.3 F | OXYGEN SATURATION: 96 % | WEIGHT: 235.01 LBS | RESPIRATION RATE: 16 BRPM | HEART RATE: 69 BPM | HEIGHT: 73 IN | BODY MASS INDEX: 31.15 KG/M2 | SYSTOLIC BLOOD PRESSURE: 110 MMHG

## 2019-12-27 DIAGNOSIS — R06.09 DOE (DYSPNEA ON EXERTION): ICD-10-CM

## 2019-12-27 DIAGNOSIS — I82.411 ACUTE DEEP VEIN THROMBOSIS (DVT) OF FEMORAL VEIN OF RIGHT LOWER EXTREMITY (HCC): ICD-10-CM

## 2019-12-27 DIAGNOSIS — I26.99 ACUTE PULMONARY EMBOLISM WITHOUT ACUTE COR PULMONALE, UNSPECIFIED PULMONARY EMBOLISM TYPE (HCC): Primary | ICD-10-CM

## 2019-12-27 LAB
ALBUMIN SERPL-MCNC: 4.2 G/DL (ref 3.5–5)
ALBUMIN/GLOB SERPL: 1.2 {RATIO} (ref 1.1–2.2)
ALP SERPL-CCNC: 88 U/L (ref 45–117)
ALT SERPL-CCNC: 23 U/L (ref 12–78)
ANION GAP SERPL CALC-SCNC: 5 MMOL/L (ref 5–15)
AST SERPL-CCNC: 18 U/L (ref 15–37)
BASOPHILS # BLD: 0 K/UL (ref 0–0.1)
BASOPHILS NFR BLD: 0 % (ref 0–1)
BILIRUB SERPL-MCNC: 2.1 MG/DL (ref 0.2–1)
BUN SERPL-MCNC: 20 MG/DL (ref 6–20)
BUN/CREAT SERPL: 20 (ref 12–20)
CALCIUM SERPL-MCNC: 9.4 MG/DL (ref 8.5–10.1)
CHLORIDE SERPL-SCNC: 108 MMOL/L (ref 97–108)
CO2 SERPL-SCNC: 25 MMOL/L (ref 21–32)
COMMENT, HOLDF: NORMAL
CREAT SERPL-MCNC: 1.02 MG/DL (ref 0.7–1.3)
DIFFERENTIAL METHOD BLD: ABNORMAL
EOSINOPHIL # BLD: 0.1 K/UL (ref 0–0.4)
EOSINOPHIL NFR BLD: 2 % (ref 0–7)
ERYTHROCYTE [DISTWIDTH] IN BLOOD BY AUTOMATED COUNT: 12.9 % (ref 11.5–14.5)
GLOBULIN SER CALC-MCNC: 3.6 G/DL (ref 2–4)
GLUCOSE SERPL-MCNC: 102 MG/DL (ref 65–100)
HCT VFR BLD AUTO: 47.5 % (ref 36.6–50.3)
HGB BLD-MCNC: 15.7 G/DL (ref 12.1–17)
IMM GRANULOCYTES # BLD AUTO: 0 K/UL (ref 0–0.04)
IMM GRANULOCYTES NFR BLD AUTO: 0 % (ref 0–0.5)
LYMPHOCYTES # BLD: 0.6 K/UL (ref 0.8–3.5)
LYMPHOCYTES NFR BLD: 11 % (ref 12–49)
MCH RBC QN AUTO: 30 PG (ref 26–34)
MCHC RBC AUTO-ENTMCNC: 33.1 G/DL (ref 30–36.5)
MCV RBC AUTO: 90.6 FL (ref 80–99)
MONOCYTES # BLD: 0.5 K/UL (ref 0–1)
MONOCYTES NFR BLD: 8 % (ref 5–13)
NEUTS SEG # BLD: 4.7 K/UL (ref 1.8–8)
NEUTS SEG NFR BLD: 79 % (ref 32–75)
NRBC # BLD: 0 K/UL (ref 0–0.01)
NRBC BLD-RTO: 0 PER 100 WBC
PLATELET # BLD AUTO: 137 K/UL (ref 150–400)
PMV BLD AUTO: 10.9 FL (ref 8.9–12.9)
POTASSIUM SERPL-SCNC: 4 MMOL/L (ref 3.5–5.1)
PROT SERPL-MCNC: 7.8 G/DL (ref 6.4–8.2)
RBC # BLD AUTO: 5.24 M/UL (ref 4.1–5.7)
RBC MORPH BLD: ABNORMAL
SAMPLES BEING HELD,HOLD: NORMAL
SODIUM SERPL-SCNC: 138 MMOL/L (ref 136–145)
WBC # BLD AUTO: 5.9 K/UL (ref 4.1–11.1)
WBC MORPH BLD: ABNORMAL

## 2019-12-27 PROCEDURE — 99284 EMERGENCY DEPT VISIT MOD MDM: CPT

## 2019-12-27 PROCEDURE — 85025 COMPLETE CBC W/AUTO DIFF WBC: CPT

## 2019-12-27 PROCEDURE — 80053 COMPREHEN METABOLIC PANEL: CPT

## 2019-12-27 PROCEDURE — 93005 ELECTROCARDIOGRAM TRACING: CPT

## 2019-12-27 PROCEDURE — 74011636320 HC RX REV CODE- 636/320: Performed by: EMERGENCY MEDICINE

## 2019-12-27 PROCEDURE — 71275 CT ANGIOGRAPHY CHEST: CPT

## 2019-12-27 PROCEDURE — 36415 COLL VENOUS BLD VENIPUNCTURE: CPT

## 2019-12-27 RX ORDER — SODIUM CHLORIDE 0.9 % (FLUSH) 0.9 %
10 SYRINGE (ML) INJECTION
Status: COMPLETED | OUTPATIENT
Start: 2019-12-27 | End: 2019-12-27

## 2019-12-27 RX ADMIN — IOPAMIDOL 100 ML: 755 INJECTION, SOLUTION INTRAVENOUS at 20:09

## 2019-12-27 RX ADMIN — Medication 10 ML: at 20:09

## 2019-12-27 NOTE — ED PROVIDER NOTES
EMERGENCY DEPARTMENT HISTORY AND PHYSICAL EXAM      Date: 12/27/2019  Patient Name: Mellie Denver    History of Presenting Illness     Chief Complaint   Patient presents with    Referral / Consult     doppler and [facility] earlier today and found DVT in right leg. referred to ED. denies chest pain or SOB. not currently on anticoags       History Provided By: Patient    HPI: Mellie Denver, 48 y.o. male  presents to the ED with cc of shortness of breath. Patient was referred to our emergency department by cardiology. He has been seeing him for the past 3 months after he had a syncopal episode. He is also been having dyspnea with exertion. He is also had complaints of leg swelling bilaterally. He was sent for a Doppler ultrasound today and it was noted that he has a age-indeterminate thrombus in the right distal femoral vein. Because of his complaint of shortness of breath with exertion he was sent to the emergency department likely to rule out PE. He was prescribed Eliquis today by the nurse practitioner. He otherwise has not been on any anticoagulants. He denies any fevers or chills. No vomiting or diarrhea. There are no other complaints, changes, or physical findings at this time. PCP: Safia Hernandez NP    No current facility-administered medications on file prior to encounter. Current Outpatient Medications on File Prior to Encounter   Medication Sig Dispense Refill    apixaban (ELIQUIS) 5 mg (74 tabs) starter pack Take 1 Dose Pack by mouth two (2) times a day. Take 10 mg (two 5 mg tablets) by mouth twice a day for 7 days Followed by 5 mg (one 5 mg tablet) by mouth twice a day 1 Dose Pack 0    cholecalciferol (VITAMIN D3) 1,000 unit cap Take  by mouth daily.  carbidopa-levodopa (SINEMET)  mg per tablet TAKE 1.5 TABLETS BY MOUTH 5 TIMES DAILY 675 Tab 5    rOPINIRole (REQUIP) 1 mg tablet Take 1 Tab by mouth three (3) times daily.  270 Tab 5    trihexyphenidyl (ARTANE) 2 mg tablet Take 1 Tab by mouth four (4) times daily. (Patient taking differently: Take 2 mg by mouth three (3) times daily.) 360 Tab 5    amantadine HCl (GOCOVRI) 137 mg cp24 Take 2 Caps by mouth nightly. 61 Cap 11       Past History     Past Medical History:  Past Medical History:   Diagnosis Date    Kidney disease     Movement disorder     Nephrolithiasis     Neurological disorder     Other ill-defined conditions(799.89)     Parkinson disease    Parkinson disease ([facility] [address]        Past Surgical History:  Past Surgical History:   Procedure Laterality Date    HX VEIN STRIPPING      HX WISDOM TEETH EXTRACTION         Family History:  Family History   Problem Relation Age of Onset    Hypertension Mother     Heart Disease Mother     Parkinsonism Father    Allen Arthritis-osteo Father     Seizures Sister        Social History:  Social History     Tobacco Use    Smoking status: Never Smoker    Smokeless tobacco: Never Used   Substance Use Topics    Alcohol use: Yes     Comment: rare    Drug use: No       Allergies:  No Known Allergies      Review of Systems   Review of Systems   Constitutional: Negative for chills and fever. HENT: Negative for congestion, ear pain, rhinorrhea, sore throat and trouble swallowing. Eyes: Negative for visual disturbance. Respiratory: Positive for shortness of breath. Negative for cough and chest tightness. Cardiovascular: Positive for leg swelling. Negative for chest pain and palpitations. Gastrointestinal: Negative for abdominal pain, blood in stool, constipation, diarrhea, nausea and vomiting. Genitourinary: Negative for decreased urine volume, difficulty urinating, dysuria and frequency. Musculoskeletal: Negative for back pain and neck pain. Skin: Negative for color change and rash. Neurological: Negative for dizziness, weakness, light-headedness and headaches.        Physical Exam   Physical Exam  Vitals signs and nursing note reviewed. Constitutional:       General: He is not in acute distress. Appearance: He is well-developed. He is not ill-appearing. Eyes:      Conjunctiva/sclera: Conjunctivae normal.   Neck:      Musculoskeletal: Neck supple. Cardiovascular:      Rate and Rhythm: Normal rate and regular rhythm. Pulmonary:      Effort: Pulmonary effort is normal. No accessory muscle usage or respiratory distress. Breath sounds: Normal breath sounds. Abdominal:      General: There is no distension. Palpations: Abdomen is soft. Tenderness: There is no tenderness. Musculoskeletal:      Right lower leg: Edema present. Left lower leg: Edema present. Lymphadenopathy:      Cervical: No cervical adenopathy. Skin:     General: Skin is warm and dry. Neurological:      Mental Status: He is alert and oriented to person, place, and time. Cranial Nerves: No cranial nerve deficit. Sensory: No sensory deficit.          Diagnostic Study Results     Labs -     Recent Results (from the past 24 hour(s))   DUPLEX LOWER EXT VENOUS BILAT    Collection Time: 12/27/19  2:30 PM   Result Value Ref Range    Left GSV Junc Diam 1.93 cm    Left GSV Junc Rfx 1,131.0 s    Left GSV Thigh Prox Diam 0.78 cm    Left GSV Thigh Prox Rfx 1,730.0 s    Left  Diam 0.49 cm    Left  Rfx 798.0 s    Left SSV Prox Diam 0.34 cm    Left SSV Prox Rfx 654.0 s    Right GSV BK Mid Diam 0.96 cm    Right GSV BK Mid Rfx 0.0 s    Right GSV Junc Diam 1.32 cm    Right GSV Junc Rfx 1,685.0 s    Right GSV Thigh Prox Diam 0.67 cm    Right GSV Thigh Prox Rfx 1,619.0 s    Right SSV Prox Diam 0.36 cm    Right SSV Prox Rfx 0.0 s   EKG, 12 LEAD, INITIAL    Collection Time: 12/27/19  3:28 PM   Result Value Ref Range    Ventricular Rate 87 BPM    Atrial Rate 87 BPM    P-R Interval 152 ms    QRS Duration 76 ms    Q-T Interval 372 ms    QTC Calculation (Bezet) 447 ms    Calculated P Axis -8 degrees    Calculated R Axis -3 degrees Calculated T Axis 26 degrees    Diagnosis       Normal sinus rhythm  Normal ECG  When compared with ECG of 21-OCT-2019 12:08,  premature ventricular complexes are no longer present  Questionable change in QRS axis     CBC WITH AUTOMATED DIFF    Collection Time: 12/27/19  3:29 PM   Result Value Ref Range    WBC 5.9 4.1 - 11.1 K/uL    RBC 5.24 4.10 - 5.70 M/uL    HGB 15.7 12.1 - 17.0 g/dL    HCT 47.5 36.6 - 50.3 %    MCV 90.6 80.0 - 99.0 FL    MCH 30.0 26.0 - 34.0 PG    MCHC 33.1 30.0 - 36.5 g/dL    RDW 12.9 11.5 - 14.5 %    PLATELET 575 (L) 393 - 400 K/uL    MPV 10.9 8.9 - 12.9 FL    NRBC 0.0 0  WBC    ABSOLUTE NRBC 0.00 0.00 - 0.01 K/uL    NEUTROPHILS 79 (H) 32 - 75 %    LYMPHOCYTES 11 (L) 12 - 49 %    MONOCYTES 8 5 - 13 %    EOSINOPHILS 2 0 - 7 %    BASOPHILS 0 0 - 1 %    IMMATURE GRANULOCYTES 0 0.0 - 0.5 %    ABS. NEUTROPHILS 4.7 1.8 - 8.0 K/UL    ABS. LYMPHOCYTES 0.6 (L) 0.8 - 3.5 K/UL    ABS. MONOCYTES 0.5 0.0 - 1.0 K/UL    ABS. EOSINOPHILS 0.1 0.0 - 0.4 K/UL    ABS. BASOPHILS 0.0 0.0 - 0.1 K/UL    ABS. IMM. GRANS. 0.0 0.00 - 0.04 K/UL    DF AUTOMATED      RBC COMMENTS NORMOCYTIC, NORMOCHROMIC      WBC COMMENTS REACTIVE LYMPHS     METABOLIC PANEL, COMPREHENSIVE    Collection Time: 12/27/19  3:29 PM   Result Value Ref Range    Sodium 138 136 - 145 mmol/L    Potassium 4.0 3.5 - 5.1 mmol/L    Chloride 108 97 - 108 mmol/L    CO2 25 21 - 32 mmol/L    Anion gap 5 5 - 15 mmol/L    Glucose 102 (H) 65 - 100 mg/dL    BUN 20 6 - 20 MG/DL    Creatinine 1.02 0.70 - 1.30 MG/DL    BUN/Creatinine ratio 20 12 - 20      GFR est AA >60 >60 ml/min/1.73m2    GFR est non-AA >60 >60 ml/min/1.73m2    Calcium 9.4 8.5 - 10.1 MG/DL    Bilirubin, total 2.1 (H) 0.2 - 1.0 MG/DL    ALT (SGPT) 23 12 - 78 U/L    AST (SGOT) 18 15 - 37 U/L    Alk.  phosphatase 88 45 - 117 U/L    Protein, total 7.8 6.4 - 8.2 g/dL    Albumin 4.2 3.5 - 5.0 g/dL    Globulin 3.6 2.0 - 4.0 g/dL    A-G Ratio 1.2 1.1 - 2.2     SAMPLES BEING HELD    Collection Time: 12/27/19  3:29 PM   Result Value Ref Range    SAMPLES BEING HELD TEETEE     COMMENT        Add-on orders for these samples will be processed based on acceptable specimen integrity and analyte stability, which may vary by analyte. Radiologic Studies -   CTA CHEST W OR W WO CONT   Final Result   IMPRESSION:        Acute pulmonary thromboembolism in the right hilar pulmonary artery extending to   the right lower lobe. No CT evidence of right heart strain. The findings were called to Dr. Kathy Waddell on 12/27/2019 at 20:28 hours by Dr. Marisol Sidhu. 1           CT Results  (Last 48 hours)               12/27/19 2013  CTA CHEST W OR W WO CONT Final result    Impression:  IMPRESSION:         Acute pulmonary thromboembolism in the right hilar pulmonary artery extending to   the right lower lobe. No CT evidence of right heart strain. The findings were called to Dr. Kathy Waddell on 12/27/2019 at 20:28 hours by Dr. Marisol Sidhu. [id number]           Narrative:  EXAMINATION:  CTA CHEST W OR W WO CONT   CLINICAL INFORMATION:  Dyspnea, on exertion, right lower extremity DVT   COMPARISON: Chest radiograph of 10/21/2019       TECHNIQUE: Precontrast  images were obtained to localize the volume for   acquisition. Multislice helical CT arteriography was performed from the   diaphragm to the thoracic inlet during uneventful rapid bolus intravenous   administration of 100 mL of Isovue  370. Lung and soft tissue windows were   generated. Post processing was performed to include 3D MIP  and coronal and   sagittal reformatted images. CT dose reduction was achieved through the use of a   standardized protocol tailored for this examination and automatic exposure   control for dose modulation. Findings   DIAGNOSTIC QUALITY: Adequate. PULMONARY EMBOLISM: Large filling defect in the right hilar pulmonary artery   extending to the right lower lobe pulmonary artery and proximal segmental   branches. No other definite filling defects. PULMONARY ARTERIES:  Normal in caliber. LUNG PARENCHYMA: No nodule or mass. No significant airspace disease. Minimal   right lower lobe peripheral airspace disease. .   THYROID: No significant abnormalities. PLEURAL EFFUSION: None. CENTRAL AIRWAYS:  Patent. ADENOPATHY:  None. HEART : Normal heart size. No CT evidence of right heart strain. GREAT VESSELS:  No significant abnormalities. UPPER ABDOMEN:  No significant abnormalities. Small hepatic cyst.   BONES:  No significant abnormalities. CXR Results  (Last 48 hours)    None            Medical Decision Making   I am the first provider for this patient. I reviewed the vital signs, available nursing notes, past medical history, past surgical history, family history and social history. Vital Signs-Reviewed the patient's vital signs. Patient Vitals for the past 24 hrs:   Temp Pulse Resp BP SpO2   12/27/19 1917     97 %   12/27/19 1808  69 16 133/83 97 %   12/27/19 1511 97.3 °F (36.3 °C) 74 18 (!) 150/93 99 %         Records Reviewed: Nursing Notes and Old Medical Records    Provider Notes (Medical Decision Making):   Patient presents to the emergency department with shortness of breath with exertion has been happening for several months. All initially started with a syncopal episode about 3 months ago. Patient has been seen cardiology. He had a Doppler ultrasound of his legs done today which showed a DVT in the right leg. Since he has been having dyspnea with exertion he was sent to the ER for evaluation. CTA of the chest here does show a pulmonary embolism and the right lower lobe. Patient is not hypoxic. Review of his echocardiogram by cardiology does not show any right heart strain. He has a pulmonary embolism severity index that is low risk for complications. Patient was prescribed Eliquis starter pack for PE/DVT by cardiology. This would be the recommended therapy for this patient's condition.   He may be discharged home to follow-up with cardiology, primary care doctor, and potentially a hematologist.  I did  home on the risk of starting on an anticoagulant and increased risk of bleeding. ED Course:   Initial assessment performed. The patients presenting problems have been discussed, and they are in agreement with the care plan formulated and outlined with them. I have encouraged them to ask questions as they arise throughout their visit. Orders Placed This Encounter    CTA CHEST W OR W WO CONT    CBC WITH AUTOMATED DIFF    METABOLIC PANEL, COMPREHENSIVE    Hold Sample    EKG NOTEWRITER(ASAP ONLY)    EKG, 12 LEAD, INITIAL    INSERT PERIPHERAL IV ONE TIME STAT    sodium chloride (NS) flush 10 mL    iopamidol (ISOVUE-370) 76 % injection 100 mL       EKG  Date/Time: 12/27/2019 3:28 PM  Performed by: Marisa Gallegos MD  Authorized by: Marisa Gallegos MD     ECG reviewed by ED Physician in the absence of a cardiologist: yes    Rate:     ECG rate:  87    ECG rate assessment: normal    Rhythm:     Rhythm: sinus rhythm    Ectopy:     Ectopy: none    QRS:     QRS axis:  Normal    QRS intervals:  Normal  Conduction:     Conduction: normal    ST segments:     ST segments:  Normal  T waves:     T waves: normal            Critical Care Time:   0    Disposition:  Discharge  8:44 PM    The patient's emergency department evaluation is now complete. I have reviewed all labs, imaging, and pertinent information. I have discussed all results with the patient and/or family. Based on our evaluation today I do believe that the patient is safe to be discharged home. The patient has been provided with at home instructions that are pertinent to their complaint today, although these may not be specific to the exact diagnosis. I have reviewed the patient's home medications and attempted to reconcile if not already done so by pharmacy or nursing staff.   I have discussed all new prescriptions with the patient. The patient has been encouraged to follow-up with primary care doctor and/or specialist, and these have been discussed with the patient. The patient has been advised that they may return to the emergency department if they have any worsening symptoms and or new symptoms that are of concern to them. Verbal discharge instructions may have also been provided to the patient that may not be specifically contained in the written discharge instructions. The patient has been given opportunity to ask questions prior to discharge. PLAN:  1. Current Discharge Medication List        2. Follow-up Information     Follow up With Specialties Details Why Contact Info    Eze Slade NP Nurse Practitioner Schedule an appointment as soon as possible for a visit in 1 week  [address]  [phone] [facility] [address]      Shanel Wilder MD Cardiology, [address] [facility] Vascular Surgery, Internal Medicine Schedule an appointment as soon as possible for a visit  As needed [address]           Return to ED if worse     Diagnosis     Clinical Impression:   1. Acute pulmonary embolism without acute cor pulmonale, unspecified pulmonary embolism type ([address]    2. CHE (dyspnea on exertion)    3. Acute deep vein thrombosis (DVT) of femoral vein of right lower extremity (HCC)            This note will not be viewable in MyChart.

## 2019-12-27 NOTE — PROGRESS NOTES
Patient presents to office for bilateral lower extremity venous duplex--found to have DVTs on the right leg: femoral distal portion and proximal-mid popliteal.  He reports remote hx of mild blood clot in his left leg 15 yrs ago but was not treated with blood thinner and was told to just keep legs elevated---Patient is not really sure as it has been a while. Today, he denies any cardiac symptoms: sob, cp, dizziness, syncope. I will initiate Eliquis 10 mg twice daily x7 days then reduce to 5 mg twice daily then after---minimum of 6 mos therapy. Discussed the case with Dr Blancas---recommend patient to be further evaluated in the ED given his history of syncopal episode  Around end of October. Also recommend hematology referral for further guidance as this is POSSIBLY his second episode of DVT. I called the patient and he is in agreement with the plan.

## 2019-12-28 LAB
ATRIAL RATE: 87 BPM
CALCULATED P AXIS, ECG09: -8 DEGREES
CALCULATED R AXIS, ECG10: -3 DEGREES
CALCULATED T AXIS, ECG11: 26 DEGREES
DIAGNOSIS, 93000: NORMAL
P-R INTERVAL, ECG05: 152 MS
Q-T INTERVAL, ECG07: 372 MS
QRS DURATION, ECG06: 76 MS
QTC CALCULATION (BEZET), ECG08: 447 MS
VENTRICULAR RATE, ECG03: 87 BPM

## 2019-12-28 NOTE — ED NOTES
Discharge instructions given. Pt and family verbalized understanding. Denies questions or concerns. Left department ambulatory.

## 2019-12-31 ENCOUNTER — TELEPHONE (OUTPATIENT)
Dept: CARDIOLOGY CLINIC | Age: 54
End: 2019-12-31

## 2019-12-31 NOTE — TELEPHONE ENCOUNTER
Patient informed was told he had blood clots in his legs went to ED and found clots in lugs states has appt with Dr Kirstin Dc 1/7/20 @ 100 ?  If this is correct please check

## 2019-12-31 NOTE — TELEPHONE ENCOUNTER
----- Message from Everette Camarillo NP sent at 12/30/2019 12:25 PM EST -----  Ursula,    Please call the patient and inform that event monitor did show frequent PVCs -- about 10% of all beats were PVCs. He did not report dizziness episodes during his monitor, nor did he report syncopal episodes while he was being monitored, so we can't really say that PVCs are causing his symptoms. Would have him f/u with Dr. Samreen Berry to discuss further -- medications, etc, to treat PVCs and assess symptoms more clearly.     Thanks,  Dennis Reina

## 2020-01-03 ENCOUNTER — TELEPHONE (OUTPATIENT)
Dept: CARDIOLOGY CLINIC | Age: 55
End: 2020-01-03

## 2020-01-03 NOTE — TELEPHONE ENCOUNTER
----- Message from Maribell Drake NP sent at 1/3/2020  8:11 AM EST -----  Needs to keep appt with both docs. Dr. Robledo Sender for DVT/venous insufficiency  Dr. Hieu Moya to discuss results of event monitor  ----- Message -----  From: Araceli Greene LPN  Sent: 3/7/9597   8:07 AM EST  To: Maribell Drake NP    Please advise it looks like both appts were requested with D/C plan thanks  ----- Message -----  From: Mary Alice Cardenas  Sent: 1/3/2020   7:53 AM EST  To: Marti William LPN, Nayla Fulton    The below is the message that José Aaron sent me regarding Mr. Robert Elder  ----- Message -----  From: Marisol Okeefe  Sent: 1/2/2020   4:52 PM EST  To: Lars Bird    I received a message from Dr. Robledo Sender on this pt to get him scheduled for his test results on his legs, I see you scheduled this pt for Dr. Hieu Moya on 2/7, I do not think he still need the apt w/ Dr. Hieu Moya, b/c he will be seeing Dr. Robledo Sender for the blood clot in his legs, But im not sure that's why I didn't cancel the apt, was leaving that up to you, just didn't want a pt to come back to see. Dr. Hieu Moya for the same issue he is seeing Dr. Robledo Sender for. Let me know.     Thanks :)

## 2020-01-21 ENCOUNTER — OFFICE VISIT (OUTPATIENT)
Dept: CARDIOLOGY CLINIC | Age: 55
End: 2020-01-21

## 2020-01-21 VITALS
RESPIRATION RATE: 18 BRPM | HEART RATE: 103 BPM | HEIGHT: 73 IN | OXYGEN SATURATION: 97 % | WEIGHT: 235 LBS | BODY MASS INDEX: 31.14 KG/M2 | DIASTOLIC BLOOD PRESSURE: 80 MMHG | SYSTOLIC BLOOD PRESSURE: 124 MMHG

## 2020-01-21 DIAGNOSIS — I82.4Y9 DEEP VEIN THROMBOSIS (DVT) OF PROXIMAL LOWER EXTREMITY, UNSPECIFIED CHRONICITY, UNSPECIFIED LATERALITY (HCC): ICD-10-CM

## 2020-01-21 DIAGNOSIS — I83.893 VARICOSE VEINS OF BOTH LEGS WITH EDEMA: ICD-10-CM

## 2020-01-21 DIAGNOSIS — G20 PARKINSON'S DISEASE (HCC): ICD-10-CM

## 2020-01-21 DIAGNOSIS — R55 SYNCOPE AND COLLAPSE: ICD-10-CM

## 2020-01-21 DIAGNOSIS — I87.2 VENOUS INSUFFICIENCY: Primary | ICD-10-CM

## 2020-01-21 PROBLEM — I82.409 DVT (DEEP VENOUS THROMBOSIS) (HCC): Status: ACTIVE | Noted: 2020-01-21

## 2020-01-21 NOTE — PROGRESS NOTES
1. Have you been to the ER, urgent care clinic since your last visit? Hospitalized since your last visit? Yes on 12/27/19 for blood clot    2. Have you seen or consulted any other health care providers outside of the [address] [place] [address] since your last visit? Include any pap smears or colon screening. No     Chief Complaint   Patient presents with    Results     for lower extremity duplex     Pt denies any leg pain, swelling, cramping.

## 2020-01-21 NOTE — PROGRESS NOTES
1/21/2020 11:00 AM      Subjective:     Elida Hall with pmhx Parkinson's disease, Right LL PE, right leg DVT presents to vascular clinic for venous insufficiency per LE venous duplex 12/27/19:  Occlusive thrombus Right distal femoral vein  Subacute nonocclusive thrombus Right GSV. Left GSV and small SV reflux. We started him on Eliquis for DVT, sent to ED d/t hx of syncopal episode back in October. In the ED, his chest CT showed PE   On right lower lobe. He had NOT seen PCP after his ED visit. He reports hx of vein stripping left leg 20 yrs ago, blood clot unsure which leg about 20 yrs ago as well. Both parents have varicose veis  And mother has hx of DVT. Symptoms wise, he endorses bilateral leg swelling, varicose veins, itching and skin color changes. He denies chest pain, chest pressure/discomfort, dyspnea, palpitations, irregular heart beats, near-syncope, syncope, fatigue, orthopnea, paroxysmal nocturnal dyspnea, exertional chest pressure/discomfort, claudication, tachypnea, dyspnea on exertion, dizziness. 1.  Have you ever had vein stripping surgery YES -- left leg about 20 yrs ago       2. Have you ever had vein injections? NO        3. Have you ever had a blood clot? YES 12/19 right leg and about 20 yrs ago       4. Have you ever had phlebitis? NO                                                                        Does anyone in your family have (or used to have) varicose veins, spider veins, leg ulcers or swollen legs? Father  YES  Mother YES  Brother(s) NO  Sister(s) NO  Other NO           1. Do you experience any of the following in your legs? Aching/pain? [] One leg [] Both legs  Heaviness? [] One leg [] Both legs  Tiredness/fatigue? [] One leg [] Both legs  Itching/burning? [] One leg [x] Both legs  Swollen ankles? [] One leg [x] Both legs  Leg cramps? [] One leg [] Both legs  Restless legs?    [] One leg [] Both legs  Throbbing? [] One leg [] Both legs  Other? 2.  Have your veins gotten worse in recent months? NO        3. Do you take any medication for pain (i.e., Advil, Motrin)  NO           4. Do you elevate your legs to relieve discomfort? YES        5. Do you exercise? YES        6. Do you wear prescription compression stockings? NO         7. Do you wear light support hose (i.e., [employer])? NO                    8.    Do you have any problem walking? YES , has parkinson's disease                                9.   What type of work do you do? On disability        10. Have you ever had any test(s) done on your veins? YES          11. Were you diagnosed with saphenous vein reflux? YES         Visit Vitals  /80 (BP 1 Location: Left arm, BP Patient Position: Sitting)   Pulse (!) 103   Resp 18   Ht 6' 1\" (1.854 m)   Wt 235 lb (106.6 kg)   SpO2 97%   BMI 31.00 kg/m²       Current Outpatient Medications   Medication Sig    apixaban (ELIQUIS) 5 mg (74 tabs) starter pack Take 1 Dose Pack by mouth two (2) times a day. Take 10 mg (two 5 mg tablets) by mouth twice a day for 7 days Followed by 5 mg (one 5 mg tablet) by mouth twice a day    cholecalciferol (VITAMIN D3) 1,000 unit cap Take  by mouth daily.  carbidopa-levodopa (SINEMET)  mg per tablet TAKE 1.5 TABLETS BY MOUTH 5 TIMES DAILY    rOPINIRole (REQUIP) 1 mg tablet Take 1 Tab by mouth three (3) times daily.  trihexyphenidyl (ARTANE) 2 mg tablet Take 1 Tab by mouth four (4) times daily. (Patient taking differently: Take 2 mg by mouth three (3) times daily.)    amantadine HCl (GOCOVRI) 137 mg cp24 Take 2 Caps by mouth nightly. No current facility-administered medications for this visit.           Objective:      Visit Vitals  /80 (BP 1 Location: Left arm, BP Patient Position: Sitting)   Pulse (!) 103   Resp 18   Ht 6' 1\" (1.854 m)   Wt 235 lb (106.6 kg)   SpO2 97%   BMI 31.00 kg/m²       Data Review: Past Medical History:   Diagnosis Date    Kidney disease     Movement disorder     Nephrolithiasis     Neurological disorder     Other ill-defined conditions(799.89)     Parkinson disease    Parkinson disease ([facility] [address]       Past Surgical History:   Procedure Laterality Date    HX VEIN STRIPPING      HX WISDOM TEETH EXTRACTION       No Known Allergies   Family History   Problem Relation Age of Onset    Hypertension Mother     Heart Disease Mother     Parkinsonism Father     Arthritis-osteo Father     Seizures Sister       Social History     Socioeconomic History    Marital status:      Spouse name: Not on file    Number of children: Not on file    Years of education: Not on file    Highest education level: Not on file   Occupational History    Not on file   Social Needs    Financial resource strain: Not on file    Food insecurity:     Worry: Not on file     Inability: Not on file    Transportation needs:     Medical: Not on file     Non-medical: Not on file   Tobacco Use    Smoking status: Never Smoker    Smokeless tobacco: Never Used   Substance and Sexual Activity    Alcohol use: Yes     Comment: rare    Drug use: No    Sexual activity: Not Currently   Lifestyle    Physical activity:     Days per week: Not on file     Minutes per session: Not on file    Stress: Not on file   Relationships    Social connections:     Talks on phone: Not on file     Gets together: Not on file     Attends Protestant service: Not on file     Active member of club or organization: Not on file     Attends meetings of clubs or organizations: Not on file     Relationship status: Not on file    Intimate partner violence:     Fear of current or ex partner: Not on file     Emotionally abused: Not on file     Physically abused: Not on file     Forced sexual activity: Not on file   Other Topics Concern    Not on file   Social History Narrative    Not on file       Review of Systems     General: Not Present- Anorexia, Chills, Dietary Changes, Fatigue, Fever, Medication Changes, Night Sweats, Weight Gain > 10lbs. and Weight Loss > 10lbs. .  Skin: Not Present- Bruising and Excessive Sweating. HEENT: Not Present- Headache, Visual Loss and Vertigo. Respiratory: Not Present- Cough, Decreased Exercise Tolerance, Difficulty Breathing, Snoring and Wheezing. Cardiovascular: Not Present- Abnormal Blood Pressure, Chest Pain, Claudications, Difficulty Breathing On Exertion,  Fainting / Blacking Out, Irregular Heart Beat, Night Cramps, Orthopnea, Palpitations, Paroxysmal Nocturnal Dyspnea, Rapid Heart Rate, Shortness of Breath and   Gastrointestinal: Not Present- Black, Tarry Stool, Bloody Stool, Diarrhea, Hematemesis, Rectal Bleeding and Vomiting. Musculoskeletal: Not Present- Muscle Pain and Muscle Weakness. Neurological: Not Present- Dizziness. Psychiatric: Not Present- Depression. Endocrine: Not Present- Cold Intolerance, Heat Intolerance and Thyroid Problems. Hematology: Not Present- Abnormal Bleeding, Anemia, Blood Clots and Easy Bruising. Physical Exam   The physical exam findings are as follows:       General   Mental Status - Alert. General Appearance - Not in acute distress. Chest and Lung Exam   Inspection: Accessory muscles - No use of accessory muscles in breathing. Auscultation:   Breath sounds: - Normal.      Cardiovascular   Inspection: Jugular vein - Bilateral - Inspection Normal.  Palpation/Percussion:   Apical Impulse: - Normal.  Auscultation: Rhythm - Regular. Heart Sounds - S1 WNL and S2 WNL. No S3 or S4. Murmurs & Other Heart Sounds: Auscultation of the heart reveals - No Murmurs. Carotid arteries - No Carotid bruit. Abdomen   Palpation/Percussion: Palpation and Percussion of the abdomen reveal - No Palpable abdominal masses.   Auscultation: Auscultation of the abdomen reveals - Bowel sounds normal.      Neurologic   Mental Status: Affect - normal.  Motor: - Normal. Gait - Normal.      Peripheral Vascular   Upper Extremity: Inspection - Bilateral - No Cyanotic nailbeds or Digital clubbing. Lower Extremity:   Palpation:   Femoral pulse - Rt. [x] normal  [] weak  [] non palpable     Lt. [x] normal  [] weak  [] non palpable                             Popliteal pulse - Rt. [x] normal  [] weak  [] non palpable     Lt. [x] normal  [] weak  [] non palpable       Dorsalis pedis pulse - Rt. [x] normal  [] weak  [] non palpable     Lt. [x] normal  [] weak  [] non palpable    Posterior tibia pulse - Rt. [x] normal  [] weak  [] non palpable     Lt. [x] normal  [] weak  [] non palpable                                             Edema:       Rt. Leg  [x]             Lt. Leg  [x]  Telangiectasia & spider veins: Rt. Leg  []             Lt. Leg  []  Varicose veins:   Rt. Leg  [x]             Lt. Leg  [x]   Skin pigmentation:   Rt. Leg  [x]             Lt. Leg  [x]   Healed venous ulcer:   Rt. Leg  []             Lt. Leg  []                                            Assessment:       ICD-10-CM ICD-9-CM    1. Venous insufficiency I87.2 459.81    2. Syncope and collapse R55 780.2    3. Parkinson's disease ([facility] [address] G20 332.0    4. Deep vein thrombosis (DVT) of proximal lower extremity, unspecified chronicity, unspecified laterality (HCC) I82.4Y9 453.41    5. Varicose veins of both legs with edema I83.893 454.8        Plan: This is a 47 YO M  with pmhx Parkinson's disease, Right LL PE, right leg DVT presents to vascular clinic for venous insufficiency per LE venous duplex 12/27/19:  Occlusive thrombus Right distal femoral vein  Subacute nonocclusive thrombus Right GSV. Left GSV and small SV reflux. We started him on Eliquis for DVT, sent to ED d/t hx of syncopal episode back in October. In the ED, his chest CT showed PE   On right lower lobe. He had NOT seen PCP after his ED visit.   He reports hx of vein stripping left leg 20 yrs ago, blood clot unsure which leg about 20 yrs ago as well.  Both parents have varicose veis  And mother has hx of DVT. Symptoms wise, he endorses bilateral leg swelling, varicose veins, itching and skin color changes. 1. Venous Insufficiency per LE dopplers, thrombus right femoral vein 12/19. CEAP 4.   C/o bilateral leg swelling, varicose veins, itching, skin color changes. Continue daily leg elevation, mild exercise and wt reduction. Use compression stockings left leg  2. Pulmonary embolism RLL per CT 12/19,  Acute DVT femoral vein, right lower extremity. On eliquis 5 mg BID,  Will need lifelong [address] d/t hx of remote DVT  3. Cardiac care per Dr David Vizcaino. Braxton Uribe MD  1/21/2020      Patient seen and examined by me with nurse practitioner in vascular clinic. I personally performed all components of the history, physical, and medical decision making and agree with the assessment and plan as noted. Due to recurrent DVT and PE, recommend long term oral AC. Compression stocking in left LE. Will f/u in 6 months and repeat US to assess for documentation of clot resolution in right LE. Per pt breathing is much better since on oral AC.      Braxton Uribe MD

## 2020-01-22 ENCOUNTER — TELEPHONE (OUTPATIENT)
Dept: CARDIOLOGY CLINIC | Age: 55
End: 2020-01-22

## 2020-04-01 ENCOUNTER — TELEPHONE (OUTPATIENT)
Dept: NEUROLOGY | Age: 55
End: 2020-04-01

## 2020-04-01 NOTE — TELEPHONE ENCOUNTER
----- Message from Kj Quijano sent at 3/31/2020  4:23 PM EDT -----  Regarding: Dr. Layton Gomez  General Message/Vendor Calls    Caller's first and last name: Lucy Chappell- mother      Reason for call: please transfer medical records to the office of Dr. Pia Tnea.  The fax number [phone] and office number [phone] required yes/no and why: yes      Best contact number(s): [phone]      Details to clarify the request:      Kj Quijano

## 2020-04-21 ENCOUNTER — DOCUMENTATION ONLY (OUTPATIENT)
Dept: NEUROLOGY | Age: 55
End: 2020-04-21

## 2020-04-21 NOTE — PROGRESS NOTES
Medical records were faxed [facility] [phone] and confirmation was received.

## 2020-05-18 DIAGNOSIS — M54.40 BACK PAIN OF LUMBAR REGION WITH SCIATICA: ICD-10-CM

## 2020-05-18 DIAGNOSIS — M54.2 NECK PAIN: ICD-10-CM

## 2020-05-18 DIAGNOSIS — M54.16 LUMBAR BACK PAIN WITH RADICULOPATHY AFFECTING LEFT LOWER EXTREMITY: ICD-10-CM

## 2020-05-18 DIAGNOSIS — G24.9 DYSKINESIA DUE TO PARKINSON'S DISEASE (HCC): ICD-10-CM

## 2020-05-18 DIAGNOSIS — G20 DYSKINESIA DUE TO PARKINSON'S DISEASE (HCC): ICD-10-CM

## 2020-05-18 DIAGNOSIS — M47.812 CERVICAL SPONDYLOSIS WITHOUT MYELOPATHY: ICD-10-CM

## 2020-05-18 DIAGNOSIS — G20 PARKINSON'S DISEASE (HCC): ICD-10-CM

## 2020-05-18 RX ORDER — AMANTADINE 137 MG/1
2 CAPSULE, COATED PELLETS ORAL
Qty: 60 CAP | Refills: 11 | Status: SHIPPED | OUTPATIENT
Start: 2020-05-18 | End: 2022-02-25

## 2020-05-18 NOTE — TELEPHONE ENCOUNTER
Future Appointments   Date Time Provider Maira Artis   6/30/2020  9:00 AM Lexi Lorenzo NP [address]   7/28/2020 10:30 AM Davi Kirkland MD [facility]   9/15/2020 10:20 AM Kg Holm  S[place]                         Last Appointment My Department:  10/21/2019    Please review and send in refill below if warranted. Requested Prescriptions     Pending Prescriptions Disp Refills    amantadine HCL (Gocovri) 137 mg cp24 60 Cap 11     Sig: Take 2 Caps by mouth nightly.

## 2020-06-23 NOTE — PROGRESS NOTES
Patient received a 2 week event monitor. Instructions given verbally as well as an instruction sheet. Pt verbalized understanding.     [facility] Event Monitoring
Ursula,    Please call the patient and inform that event monitor did show frequent PVCs -- about 10% of all beats were PVCs. He did not report dizziness episodes during his monitor, nor did he report syncopal episodes while he was being monitored, so we can't really say that PVCs are causing his symptoms. Would have him f/u with Dr. Collette Falcon to discuss further -- medications, etc, to treat PVCs and assess symptoms more clearly.     Thanks,  [employer]
No

## 2020-06-30 ENCOUNTER — OFFICE VISIT (OUTPATIENT)
Dept: INTERNAL MEDICINE CLINIC | Age: 55
End: 2020-06-30

## 2020-06-30 VITALS
HEART RATE: 76 BPM | BODY MASS INDEX: 30.62 KG/M2 | HEIGHT: 74 IN | WEIGHT: 238.6 LBS | RESPIRATION RATE: 16 BRPM | OXYGEN SATURATION: 97 % | SYSTOLIC BLOOD PRESSURE: 112 MMHG | DIASTOLIC BLOOD PRESSURE: 80 MMHG | TEMPERATURE: 97.3 F

## 2020-06-30 DIAGNOSIS — R73.9 HYPERGLYCEMIA: ICD-10-CM

## 2020-06-30 DIAGNOSIS — E55.9 VITAMIN D DEFICIENCY: ICD-10-CM

## 2020-06-30 DIAGNOSIS — N39.0 URINARY TRACT INFECTION WITHOUT HEMATURIA, SITE UNSPECIFIED: ICD-10-CM

## 2020-06-30 DIAGNOSIS — E78.5 HYPERLIPIDEMIA, UNSPECIFIED HYPERLIPIDEMIA TYPE: ICD-10-CM

## 2020-06-30 DIAGNOSIS — G20 PARKINSON'S DISEASE (HCC): Primary | ICD-10-CM

## 2020-06-30 DIAGNOSIS — Z00.00 MEDICARE ANNUAL WELLNESS VISIT, SUBSEQUENT: ICD-10-CM

## 2020-06-30 DIAGNOSIS — R53.83 FATIGUE, UNSPECIFIED TYPE: ICD-10-CM

## 2020-06-30 DIAGNOSIS — N40.0 BENIGN PROSTATIC HYPERPLASIA, UNSPECIFIED WHETHER LOWER URINARY TRACT SYMPTOMS PRESENT: ICD-10-CM

## 2020-06-30 LAB
A-G RATIO,AGRAT: 1.5 RATIO
ALBUMIN SERPL-MCNC: 4.6 G/DL (ref 3.9–5.4)
ALP SERPL-CCNC: 77 U/L (ref 38–126)
ALT SERPL-CCNC: 5 U/L (ref 0–50)
ANION GAP SERPL CALC-SCNC: 11 MMOL/L
AST SERPL W P-5'-P-CCNC: 19 U/L (ref 14–36)
BACTERIA,BACTU: ABNORMAL
BILIRUB SERPL-MCNC: 2.1 MG/DL (ref 0.2–1.3)
BILIRUB UR QL: NEGATIVE
BUN SERPL-MCNC: 23 MG/DL (ref 9–20)
BUN/CREATININE RATIO,BUCR: 19 RATIO
CALCIUM SERPL-MCNC: 9.6 MG/DL (ref 8.4–10.2)
CHLORIDE SERPL-SCNC: 105 MMOL/L (ref 98–107)
CHOL/HDL RATIO,CHHD: 3 RATIO (ref 0–4)
CHOLEST SERPL-MCNC: 169 MG/DL (ref 0–200)
CLARITY: CLEAR
CO2 SERPL-SCNC: 23 MMOL/L (ref 22–32)
COLOR UR: ABNORMAL
CREAT SERPL-MCNC: 1.2 MG/DL (ref 0.8–1.5)
ERYTHROCYTE [DISTWIDTH] IN BLOOD BY AUTOMATED COUNT: 13.8 %
GLOBULIN,GLOB: 3
GLUCOSE 24H UR-MRATE: NEGATIVE G/(24.H)
GLUCOSE SERPL-MCNC: 89 MG/DL (ref 75–110)
HBA1C MFR BLD HPLC: 5.3 % (ref 4–5.7)
HCT VFR BLD AUTO: 47.7 % (ref 37–51)
HDLC SERPL-MCNC: 59 MG/DL (ref 35–130)
HGB BLD-MCNC: 16.1 G/DL (ref 12–18)
HGB UR QL STRIP: NEGATIVE
KETONES UR QL STRIP.AUTO: ABNORMAL
LDL/HDL RATIO,LDHD: 2 RATIO
LDLC SERPL CALC-MCNC: 92 MG/DL (ref 0–130)
LEUKOCYTE ESTERASE: NEGATIVE
LYMPHOCYTES ABSOLUTE: 1.4 K/UL (ref 0.6–4.1)
LYMPHOCYTES NFR BLD: 26.4 % (ref 10–58.5)
MCH RBC QN AUTO: 31.7 PG (ref 26–32)
MCHC RBC AUTO-ENTMCNC: 33.8 G/DL (ref 30–36)
MCV RBC AUTO: 93.9 FL (ref 80–97)
MONOCYTES ABS-DIF,2141: 0.5 K/UL (ref 0–1.8)
MONOCYTES NFR BLD: 9.2 % (ref 0.1–24)
NEUTROPHILS # BLD: 64.4 % (ref 37–92)
NEUTROPHILS ABS,2156: 3.5 K/UL (ref 2–7.8)
NITRITE UR QL STRIP.AUTO: NEGATIVE
PH UR STRIP: 6 [PH] (ref 5–7)
PLATELET # BLD AUTO: 143 K/UL (ref 140–440)
POTASSIUM SERPL-SCNC: 4.2 MMOL/L (ref 3.6–5)
PROT SERPL-MCNC: 7.6 G/DL (ref 6.3–8.2)
PROT UR STRIP-MCNC: ABNORMAL MG/DL
RBC # BLD AUTO: 5.08 M/UL (ref 4.2–6.3)
RBC #/AREA URNS HPF: 0 #/HPF
SODIUM SERPL-SCNC: 139 MMOL/L (ref 137–145)
SP GR UR REFRACTOMETRY: 1.03 (ref 1–1.03)
SPERM,SPRMU: ABNORMAL
TRIGL SERPL-MCNC: 88 MG/DL (ref 0–200)
UROBILINOGEN UR QL STRIP.AUTO: NEGATIVE
VLDLC SERPL CALC-MCNC: 18 MG/DL
WBC # BLD AUTO: 5.4 K/UL (ref 4.1–10.9)
WBC URNS QL MICRO: ABNORMAL #/HPF

## 2020-06-30 NOTE — PATIENT INSTRUCTIONS
Medicare Wellness Visit, Male The best way to live healthy is to have a lifestyle where you eat a well-balanced diet, exercise regularly, limit alcohol use, and quit all forms of tobacco/nicotine, if applicable. Regular preventive services are another way to keep healthy. Preventive services (vaccines, screening tests, monitoring & exams) can help personalize your care plan, which helps you manage your own care. Screening tests can find health problems at the earliest stages, when they are easiest to treat. [facility] follows the current, evidence-based guidelines published by the [facility] Gary Arun ([facility]) when recommending preventive services for our patients. Because we follow these guidelines, sometimes recommendations change over time as research supports it. (For example, a prostate screening blood test is no longer routinely recommended for men with no symptoms). Of course, you and your doctor may decide to screen more often for some diseases, based on your risk and co-morbidities (chronic disease you are already diagnosed with). Preventive services for you include: - Medicare offers their members a free annual wellness visit, which is time for you and your primary care provider to discuss and plan for your preventive service needs. Take advantage of this benefit every year! 
-All adults over age 72 should receive the recommended pneumonia vaccines. Current USPSTF guidelines recommend a series of two vaccines for the best pneumonia protection.  
-All adults should have a flu vaccine yearly and tetanus vaccine every 10 years. 
-All adults age 48 and older should receive the shingles vaccines (series of two vaccines).       
-All adults age 38-68 who are overweight should have a diabetes screening test once every three years.  
-Other screening tests & preventive services for persons with diabetes include: an eye exam to screen for diabetic retinopathy, a kidney function test, a foot exam, and stricter control over your cholesterol.  
-Cardiovascular screening for adults with routine risk involves an electrocardiogram (ECG) at intervals determined by the provider.  
-Colorectal cancer screening should be done for adults age 54-65 with no increased risk factors for colorectal cancer. There are a number of acceptable methods of screening for this type of cancer. Each test has its own benefits and drawbacks. Discuss with your provider what is most appropriate for you during your annual wellness visit. The different tests include: colonoscopy (considered the best screening method), a fecal occult blood test, a fecal DNA test, and sigmoidoscopy. 
-All adults born between [facility] should be screened once for Hepatitis C. 
-An Abdominal Aortic Aneurysm (AAA) Screening is recommended for men age 73-68 who has ever smoked in their lifetime. Here is a list of your current Health Maintenance items (your personalized list of preventive services) with a due date: 
Health Maintenance Due Topic Date Due  
 DTaP/Tdap/Td  (1 - Tdap) 03/02/1988  Shingles Vaccine (1 of 2) 03/02/2017 [address] [place] [address] Annual Well Visit  03/14/2018 Parkinson's Disease: Care Instructions Your Care Instructions Parkinson's disease can cause tremors, stiffness, and problems with movement. Severe or advanced cases can also cause problems with thinking. In Parkinson's disease, part of the brain cannot make enough dopamine, a chemical that helps control movement. Taking your medicines correctly and getting regular exercise may help you maintain your quality of life. There are many things that can cause Parkinson's disease symptoms, including some medicine, some toxins, and trauma to the head. The cause in most cases is not known. Follow-up care is a key part of your treatment and safety.  Be sure to make and go to all appointments, and call your doctor if you are having problems. It's also a good idea to know your test results and keep a list of the medicines you take. How can you care for yourself at home? General care · Take your medicines exactly as prescribed. Call your doctor if you think you are having a problem with your medicine. · Make sure your home is safe: 
? Place furniture so that you have something to hold on to as you walk around the house. ? Use chairs that make it easier to sit down and stand up. ? Group the things you use most, such as reading glasses, keys, and the telephone, in one easy-to-reach place. ? Tack down rugs so that you do not trip. ? Put no-slip tape and handrails in the tub to prevent falls. · Use a cane, walker, or scooter if your doctor suggests it. · Keep up your normal activities as much as you can. · Find ways to manage stress, which can make symptoms worse. · Spend time with family and friends. Join a support group for people with Parkinson's disease if you want extra help. · Depression is common with this condition. Tell your doctor if you have trouble sleeping, are eating too much or are not hungry, or feel sad or tearful all the time. Depression can be treated with medicine and counseling. Diet and exercise · Eat a balanced diet. · If you are taking levodopa, do not eat protein at the same time you take your medicine. Levodopa may not work as well if you take it at the same time you eat protein. You can eat normal amounts of protein. Talk to your doctor if you have questions. · If you have problems swallowing, change how and what you eat: ? Try thick drinks, such as milk shakes. They are easier to swallow than other fluids. ? Do not eat foods that crumble easily. These can cause choking. ? Use a  to prepare food. Soft foods need less chewing. ? Eat small meals often so that you do not get tired from eating heavy meals. · Drink plenty of water and eat a high-fiber diet to prevent constipation. Parkinson'sand the medicines that treat itmay slow your intestines. · Get exercise on most days. Work with your doctor to set up a program of walking, swimming, or other exercise you are able to do. When should you call for help? Call your doctor now or seek immediate medical care if: 
· You have a change in your symptoms. · You develop other problems from your condition, such as: 
? Injury from a fall. ? Thinking or memory problems. ? A urinary tract infection (burning pain when urinating). Watch closely for changes in your health, and be sure to contact your doctor if: 
· You lose weight because of problems with eating. · You want more information about your condition or your medicines. Where can you learn more? Go to http://von-joanne.info/ Enter D835 in the search box to learn more about \"Parkinson's Disease: Care Instructions. \" Current as of: November 20, 2019               Content Version: 12.5 © 6229-7500 [employer]. Care instructions adapted under license by [employer] (which disclaims liability or warranty for this information). If you have questions about a medical condition or this instruction, always ask your healthcare professional. [address] any warranty or liability for your use of this information.

## 2020-06-30 NOTE — PROGRESS NOTES
Rich Joiner is a 50 y.o. male     Chief Complaint   Patient presents with    Annual Wellness Visit     medicare wellness visit/CPE    Follow-up     Parkinson's Disease       Visit Vitals  /80 (BP 1 Location: Left arm, BP Patient Position: Sitting)   Pulse 76   Temp 97.3 °F (36.3 °C) (Oral)   Resp 16   Ht 6' 1.5\" (1.867 m)   Wt 238 lb 9.6 oz (108.2 kg)   SpO2 97%   BMI 31.05 kg/m²       Health Maintenance Due   Topic Date Due    DTaP/Tdap/Td series (1 - Tdap) 03/02/1988    Shingrix Vaccine Age 50> (1 of 2) 03/02/2017    Medicare Yearly Exam  03/14/2018       1. Have you been to the ER, urgent care clinic since your last visit? Hospitalized since your last visit? No    2. Have you seen or consulted any other health care providers outside of the [address] [place] [address] since your last visit? Include any pap smears or colon screening.  No

## 2020-06-30 NOTE — PROGRESS NOTES
This is the Subsequent Medicare Annual Wellness Exam, performed 12 months or more after the Initial AWV or the last Subsequent AWV    I have reviewed the patient's medical history in detail and updated the computerized patient record. History     Patient Active Problem List   Diagnosis Code    Parkinson's disease ([facility] [address] G20    Spondylisthesis M43.10    Nephrolithiasis N20.0    Cervical spondylosis without myelopathy M47.812    Neck pain M54.2    Dyskinesia due to Parkinson's disease ([facility] [address] G24.9, G20    Lumbar back pain with radiculopathy affecting left lower extremity M54.16    Back pain of lumbar region with sciatica M54.40    Syncope and collapse R55    Bilateral carotid artery stenosis I65.23    Convulsive syncope R55    Altered mental status, unspecified R41.82    DVT (deep venous thrombosis) ([facility]) I82.409    Varicose veins of both legs with edema I83.893     Past Medical History:   Diagnosis Date    Kidney disease     Movement disorder     Nephrolithiasis     Neurological disorder     Other ill-defined conditions(799.89)     Parkinson disease    Parkinson disease ([facility] [address]       Past Surgical History:   Procedure Laterality Date    HX VEIN STRIPPING      HX WISDOM TEETH EXTRACTION       Current Outpatient Medications   Medication Sig Dispense Refill    Eliquis 5 mg tablet TAKE 1 TABLET BY MOUTH TWICE DAILY 60 Tab 0    amantadine HCL (Gocovri) 137 mg cp24 Take 2 Caps by mouth nightly. 60 Cap 11    carbidopa-levodopa (SINEMET)  mg per tablet TAKE 1.5 TABLETS BY MOUTH 5 TIMES DAILY 675 Tab 5    rOPINIRole (REQUIP) 1 mg tablet Take 1 Tab by mouth three (3) times daily. 270 Tab 5    trihexyphenidyl (ARTANE) 2 mg tablet Take 1 Tab by mouth four (4) times daily. (Patient taking differently: Take 2 mg by mouth three (3) times daily.) 360 Tab 5    cholecalciferol (VITAMIN D3) 1,000 unit cap Take  by mouth daily.        No Known Allergies    Family History   Problem Relation Age of Onset  Hypertension Mother     Heart Disease Mother     Parkinsonism Father     Arthritis-osteo Father     Seizures Sister      Social History     Tobacco Use    Smoking status: Never Smoker    Smokeless tobacco: Never Used   Substance Use Topics    Alcohol use: Yes     Comment: rare       Depression Risk Factor Screening:     3 most recent PHQ Screens 6/30/2020   PHQ Not Done -   Little interest or pleasure in doing things Not at all   Feeling down, depressed, irritable, or hopeless Not at all   Total Score PHQ 2 0       Alcohol Risk Factor Screening (MALE < 65): Do you average more than 2 drinks per night or 14 drinks a week: No    On any one occasion in the past three months have you have had more than 4 drinks containing alcohol:  No      Functional Ability and Level of Safety:   Hearing: Hearing is good. Activities of Daily Living: The home contains: grab bars  Patient does total self care     Ambulation: with no difficulty     Fall Risk:  Fall Risk Assessment, last 12 mths 6/30/2020   Able to walk? Yes   Fall in past 12 months? No   Fall with injury? -   Number of falls in past 12 months -   Fall Risk Score -     Abuse Screen:  Patient is not abused       Cognitive Screening   Has your family/caregiver stated any concerns about your memory: no     Cognitive Screening: Normal - MMSE (Mini Mental Status Exam)    Patient Care Team   Patient Care Team:  Lj Campbell NP as PCP - General (Nurse Practitioner)  Lj Campbell NP as PCP - [facility] Provider  Pro Joiner MD (Neurology)  Butch Sprague MD as Physician (Cardiology)    Assessment/Plan   Education and counseling provided:  Are appropriate based on today's review and evaluation    Diagnoses and all orders for this visit:    1. Parkinson's disease ([facility] [address]    2.  Fatigue, unspecified type  -     CBC WITH AUTOMATED DIFF  -     METABOLIC PANEL, COMPREHENSIVE  -     TSH 3RD GENERATION  -     T4, FREE  - URINALYSIS W/O MICRO    3. Vitamin D deficiency  -     VITAMIN D, 25 HYDROXY    4. Hyperglycemia  -     HEMOGLOBIN A1C W/O EAG    5. Benign prostatic hyperplasia, unspecified whether lower urinary tract symptoms present  -     PSA, DIAGNOSTIC (PROSTATE SPECIFIC AG)    6. Hyperlipidemia, unspecified hyperlipidemia type  -     LIPID PANEL      Subjective:  Mr. Isabell Cardona is a pleasant 53-year-old gentleman, who comes in today for his updated history and physical.  He has no complaints. History of Present Illness:  Further discussion with him revealed that back in December of 2019 he had a syncopal episode at work and was seen in the emergency room, at which time he was found to have DVT in his right lower extremity, as well as an acute pulmonary thromboembolism in his right hilar pulmonary artery, extending into his right lower lobe. He saw Dr. Akua Fishman, [facility], and is now on Eliquis 5 mg twice daily. He is doing well from that standpoint. He does have bilateral lower extremity venous insufficiency, with which he gets occasional swelling. Past Medical History/Surgeries:    1. Left lower extremity vein stripping. Illnesses:  1. Parkinson's disease since age 47, for which in the past he has been under the care of Dr. Jacinto Kovacs, but he now tells me he will start seeing a new neurologist next week. 2. PE, as noted previously. 3. DVT of right lower extremity. Family History:  Father is 66years of age, alive with Parkinson's disease. Mother is 76years of age, recently had replacement of a heart valve, as well as surgery on her carotids. He tells me that she is doing well from that standpoint. Two siblings are living, one brother has had neck cancer, but he is doing well. Social History:  He is single. He works part time at social events mostly at night. He has a 51-year-old daughter who is currently finishing nursing school. Allergies:  None. Medications:  1.  Amantadine 137 mcg, two tablets at bedtime. 2. Carvedilol-Levodopa , one and a half tablets by mouth five times daily, although he tells me he only takes it three times daily. He generally takes it around 5:00 in the evening, at bedtime and at 4:00 in the morning on the days that he works. 3. Vitamin D 3,000 units daily. 4. Eliquis 5 mg twice daily. 5. Requip 1 mg three times a day. 6. Artane 2 mg three times a day. Habits:  Nonsmoker and a non drinker. Review of Systems:  HEENT:  Denies any headaches, dizziness or blurred vision. He is in need of getting an eye exam.  CVR:  Denies any chest pain or palpitations. Denies any syncopal episode. He denies any shortness of breath, cough, wheezing, hemoptysis, PND or orthopnea. Denies any ankle edema. He does have chronic changes in his lower extremities from venous stasis. He tells me that this has been a chronic issue ever since he was young. GI:  Appetite is good, weight is stable. Does have a normal bowel pattern without presence of blood in stools or melena. He did have a negative Cologuard a year ago. :  Denies any urinary symptoms. MSK:  As noted previously, he has had Parkinson's disease for quite some time. This has remained unchanged in the last year. Physical Examination:  GENERAL:  Pleasant gentleman in no acute distress. He is alert and oriented. He answers my questions appropriately. VITALS:  Blood Pressure:  112/80. Pulse: 76.  Respirations:  16.  Temperature:  97.3. O2 sat:  97. HEENT:  Normocephalic, atraumatic. PERRLA, EOMI. TMs normal.  Mouth mucosa pink. Tongue midline. Pharynx normal.  NECK:  Supple without adenopathy, thyromegaly or carotid bruits. CHEST:  Lungs clear to auscultation, no rales or wheezes. CV:  Heart regular rhythm without murmur or gallop. ABDOMEN:  Bowel sounds present in four quadrants, soft, non tender, no organomegaly or masses. EXTREMITIES:  No edema or calf tenderness. Distal pulses were present. He does have rather marked venous stasis in both lower extremities, right worse than left. No open lesions. NEUROLOGIC:  Cranial nerves II-XII intact. Excellent strength in the upper and lower extremities against resistance. Sensation is preserved. He is able to stand on his  toes and heels without any difficulty. Romberg is negative. Impression:  1. Parkinson's disease with persistent dyskinesia. 2. PE.  3. DVT right lower extremity. 4. Obesity. Plan:  1. He was fasting this morning, so therefore it was opted to get all of his lab studies. I will call him as soon as I have his results. 2. He is encouraged to continue with his current regimen, as well as to continue with a prudent diet, exercise and weight loss to keep BMI within acceptable level.   3. He is encouraged to get his eye exam.      Health Maintenance Due   Topic Date Due    DTaP/Tdap/Td series (1 - Tdap) 03/02/1988    Shingrix Vaccine Age 50> (1 of 2) 03/02/2017    Medicare Yearly Exam  03/14/2018

## 2020-07-01 LAB
25(OH)D3 SERPL-MCNC: 16 NG/ML (ref 30–96)
PSA, TEST22: 0.8 NG/ML (ref 0–4)
T4 FREE SERPL-MCNC: 1.08 NG/DL (ref 0.58–2.3)
TSH SERPL DL<=0.05 MIU/L-ACNC: 1.07 UIU/ML (ref 0.34–5.6)

## 2020-07-02 LAB — BACTERIA UR CULT: ABNORMAL

## 2020-07-07 RX ORDER — ERGOCALCIFEROL 1.25 MG/1
50000 CAPSULE ORAL
Qty: 12 CAP | Refills: 0 | Status: SHIPPED | OUTPATIENT
Start: 2020-07-07

## 2020-07-07 NOTE — PROGRESS NOTES
Lab results discussed with patient. Vitamin D 16. Start vitamin D 25297 units weekly x 3 mos. Follow up 3 months. See result of urine culture. Asymptomatic so no treatment required.

## 2020-07-14 RX ORDER — APIXABAN 5 MG/1
TABLET, FILM COATED ORAL
Qty: 60 TAB | Refills: 0 | Status: SHIPPED | OUTPATIENT
Start: 2020-07-14 | End: 2020-08-17

## 2020-07-16 DIAGNOSIS — M54.16 LUMBAR BACK PAIN WITH RADICULOPATHY AFFECTING LEFT LOWER EXTREMITY: ICD-10-CM

## 2020-07-16 DIAGNOSIS — M54.2 NECK PAIN: ICD-10-CM

## 2020-07-16 DIAGNOSIS — G20 PARKINSON'S DISEASE (HCC): ICD-10-CM

## 2020-07-16 DIAGNOSIS — G20 DYSKINESIA DUE TO PARKINSON'S DISEASE (HCC): ICD-10-CM

## 2020-07-16 DIAGNOSIS — G24.9 DYSKINESIA DUE TO PARKINSON'S DISEASE (HCC): ICD-10-CM

## 2020-07-16 DIAGNOSIS — M54.40 BACK PAIN OF LUMBAR REGION WITH SCIATICA: ICD-10-CM

## 2020-07-16 DIAGNOSIS — M47.812 CERVICAL SPONDYLOSIS WITHOUT MYELOPATHY: ICD-10-CM

## 2020-07-16 RX ORDER — TRIHEXYPHENIDYL HYDROCHLORIDE 2 MG/1
2 TABLET ORAL 4 TIMES DAILY
Qty: 360 TAB | Refills: 1 | Status: SHIPPED | OUTPATIENT
Start: 2020-07-16 | End: 2021-05-04 | Stop reason: SDUPTHER

## 2020-07-16 RX ORDER — CARBIDOPA AND LEVODOPA 25; 100 MG/1; MG/1
TABLET ORAL
Qty: 675 TAB | Refills: 1 | Status: SHIPPED | OUTPATIENT
Start: 2020-07-16

## 2020-07-16 NOTE — TELEPHONE ENCOUNTER
Future Appointments   Date Time Provider Maira Artis   7/28/2020 10:30 AM Rob Kirkland MD [facility]UDI J[facility]   9/15/2020 10:20 AM Alba Romero MD [facility]                         Last Appointment My Department:  10/21/2019    Please review and send in refill below if warranted. Requested Prescriptions     Pending Prescriptions Disp Refills    carbidopa-levodopa (SINEMET)  mg per tablet 675 Tab 1     Sig: TAKE 1.5 TABLETS BY MOUTH 5 TIMES DAILY    trihexyphenidyL (ARTANE) 2 mg tablet 360 Tab 1     Sig: Take 1 Tab by mouth four (4) times daily.

## 2020-07-28 ENCOUNTER — OFFICE VISIT (OUTPATIENT)
Dept: CARDIOLOGY CLINIC | Age: 55
End: 2020-07-28

## 2020-07-28 VITALS
SYSTOLIC BLOOD PRESSURE: 120 MMHG | BODY MASS INDEX: 30.8 KG/M2 | HEIGHT: 74 IN | DIASTOLIC BLOOD PRESSURE: 80 MMHG | RESPIRATION RATE: 16 BRPM | HEART RATE: 74 BPM | WEIGHT: 240 LBS | OXYGEN SATURATION: 96 %

## 2020-07-28 DIAGNOSIS — R55 SYNCOPE AND COLLAPSE: ICD-10-CM

## 2020-07-28 DIAGNOSIS — G20 PARKINSON'S DISEASE (HCC): ICD-10-CM

## 2020-07-28 DIAGNOSIS — I87.2 VENOUS INSUFFICIENCY: Primary | ICD-10-CM

## 2020-07-28 DIAGNOSIS — I82.4Y9 DEEP VEIN THROMBOSIS (DVT) OF PROXIMAL LOWER EXTREMITY, UNSPECIFIED CHRONICITY, UNSPECIFIED LATERALITY (HCC): ICD-10-CM

## 2020-07-28 DIAGNOSIS — I87.2 VENOUS INSUFFICIENCY OF BOTH LOWER EXTREMITIES: ICD-10-CM

## 2020-07-28 NOTE — PROGRESS NOTES
7/28/2020 10:26 AM      Subjective:     Rita Jay  Presents for follow up. Last seen by us in 1/2020. He continues to take Eliquis for DVT/PE, states resolution of right leg pain.   + occasional bilateral ankle swelling, night cramps once in a while    He denies chest pain, chest pressure/discomfort, dyspnea, palpitations, irregular heart beats, near-syncope, syncope, fatigue, paroxysmal nocturnal dyspnea, exertional chest pressure/discomfort, claudication, tachypnea, dyspnea on exertion, dizziness. Visit Vitals  /80 (BP 1 Location: Right arm, BP Patient Position: Sitting)   Pulse 74   Resp 16   Ht 6' 2\" (1.88 m)   Wt 240 lb (108.9 kg)   SpO2 96%   BMI 30.81 kg/m²       Current Outpatient Medications   Medication Sig    carbidopa-levodopa (SINEMET)  mg per tablet TAKE 1.5 TABLETS BY MOUTH 5 TIMES DAILY (Patient taking differently: TAKE 1.5 TABLETS BY MOUTH 3 TIMES DAILY)    trihexyphenidyL (ARTANE) 2 mg tablet Take 1 Tab by mouth four (4) times daily. (Patient taking differently: Take 2 mg by mouth three (3) times daily.)    Eliquis 5 mg tablet TAKE 1 TABLET BY MOUTH TWICE DAILY    ergocalciferol (ERGOCALCIFEROL) 1,250 mcg (50,000 unit) capsule Take 1 Cap by mouth every seven (7) days.  amantadine HCL (Gocovri) 137 mg cp24 Take 2 Caps by mouth nightly.  rOPINIRole (REQUIP) 1 mg tablet Take 1 Tab by mouth three (3) times daily. No current facility-administered medications for this visit.           Objective:      Visit Vitals  /80 (BP 1 Location: Right arm, BP Patient Position: Sitting)   Pulse 74   Resp 16   Ht 6' 2\" (1.88 m)   Wt 240 lb (108.9 kg)   SpO2 96%   BMI 30.81 kg/m²       Data Review:     Past Medical History:   Diagnosis Date    Kidney disease     Movement disorder     Nephrolithiasis     Neurological disorder     Other ill-defined conditions(799.89)     Parkinson disease    Parkinson disease ([facility] [address]       Past Surgical History: Procedure Laterality Date    HX VEIN STRIPPING      HX WISDOM TEETH EXTRACTION       No Known Allergies   Family History   Problem Relation Age of Onset    Hypertension Mother     Heart Disease Mother     Parkinsonism Father     Arthritis-osteo Father     Seizures Sister       Social History     Socioeconomic History    Marital status:      Spouse name: Not on file    Number of children: Not on file    Years of education: Not on file    Highest education level: Not on file   Occupational History    Not on file   Social Needs    Financial resource strain: Not on file    Food insecurity     Worry: Not on file     Inability: Not on file    Transportation needs     Medical: Not on file     Non-medical: Not on file   Tobacco Use    Smoking status: Never Smoker    Smokeless tobacco: Never Used   Substance and Sexual Activity    Alcohol use: Yes     Comment: rare    Drug use: No    Sexual activity: Not Currently   Lifestyle    Physical activity     Days per week: Not on file     Minutes per session: Not on file    Stress: Not on file   Relationships    Social connections     Talks on phone: Not on file     Gets together: Not on file     Attends Congregational service: Not on file     Active member of club or organization: Not on file     Attends meetings of clubs or organizations: Not on file     Relationship status: Not on file    Intimate partner violence     Fear of current or ex partner: Not on file     Emotionally abused: Not on file     Physically abused: Not on file     Forced sexual activity: Not on file   Other Topics Concern    Not on file   Social History Narrative    Not on file       Review of Systems     General: Not Present- Anorexia, Chills, Dietary Changes, Fatigue, Fever, Medication Changes, Night Sweats, Weight Gain > 10lbs. and Weight Loss > 10lbs. .  Skin: Not Present- Bruising and Excessive Sweating.   HEENT: Not Present- Headache, Visual Loss and Vertigo. Respiratory: Not Present- Cough, Decreased Exercise Tolerance, Difficulty Breathing, Snoring and Wheezing. Cardiovascular: Not Present- Abnormal Blood Pressure, Chest Pain, Claudications, Difficulty Breathing On Exertion, Edema, Fainting / Blacking Out, Irregular Heart Beat,  Orthopnea, Palpitations, Paroxysmal Nocturnal Dyspnea, Rapid Heart Rate, Shortness of Breath   Gastrointestinal: Not Present- Black, Tarry Stool, Bloody Stool, Diarrhea, Hematemesis, Rectal Bleeding and Vomiting. Musculoskeletal: Not Present- Muscle Pain and Muscle Weakness. Neurological: Not Present- Dizziness. Psychiatric: Not Present- Depression. Endocrine: Not Present- Cold Intolerance, Heat Intolerance and Thyroid Problems. Hematology: Not Present- Abnormal Bleeding, Anemia, Blood Clots and Easy Bruising. Physical Exam   The physical exam findings are as follows:     General   Mental Status - Alert. General Appearance - Cooperative and Well groomed. Not in acute distress. Orientation - Oriented to time, Oriented to place and Oriented to person. Build & Nutrition - Well developed. Skin   General: - Normal.      HEENT  Head - Normal.  Eye - Normal.  Mouth & Throat - Normal.      Neck   Carotid Arteries - normal upstroke. No Bruits. Thyroid: Gland - Normal size and consistency. Chest and Lung Exam   Inspection:   Chest Wall: - Normal. Accessory muscles - No use of accessory muscles in breathing. Auscultation:   Breath sounds: - Normal.      Cardiovascular   Inspection: Jugular vein - Bilateral - Inspection Normal.  Palpation/Percussion:   Apical Impulse: - Normal.  Auscultation: Rhythm - Regular. Heart Sounds - S1 WNL and S2 WNL. No S3 or S4. Murmurs & Other Heart Sounds: Auscultation of the heart reveals - No Murmurs. Abdomen   Palpation/Percussion: Palpation and Percussion of the abdomen reveal - No Palpable abdominal masses.   Liver: - Normal.  Spleen: - Normal.  Auscultation: Auscultation of the abdomen reveals - Bowel sounds normal.      Neurologic   Mental Status: Affect - normal.  Motor: - Normal. Gait - Normal.      Assessment:       ICD-10-CM ICD-9-CM    1. Venous insufficiency  I87.2 459.81    2. Syncope and collapse  R55 780.2    3. Deep vein thrombosis (DVT) of proximal lower extremity, unspecified chronicity, unspecified laterality (HCC)  I82.4Y9 453.41    4. Parkinson's disease ([facility] [address]  G20 332.0        Plan:     Patient presents for follow up. Last seen by us in 1/2020. He continues to take Eliquis for DVT/PE, states resolution of right leg pain. + occasional bilateral ankle swelling, night cramps once in a while    LE venous duplex 12/27/19:  Occlusive thrombus Right distal femoral vein  Subacute nonocclusive thrombus Right GSV. Left GSV and small SV reflux. We started him on Eliquis for DVT, sent to ED d/t hx of syncopal episode back in October. In the ED, his chest CT showed PE   On right lower lobe. He had NOT seen PCP after his ED visit. He reports hx of vein stripping left leg 20 yrs ago, blood clot unsure which leg about 20 yrs ago as well. Both parents have varicose veis  And mother has hx of DVT.     1. Venous Insufficiency per LE dopplers, thrombus right femoral vein 12/19. Bigelow Plaster Continue daily leg elevation, mild exercise and wt reduction. Continue compression stockings left leg. 2. Pulmonary embolism RLL per CT 12/19,  Acute DVT femoral vein, right lower extremity. On eliquis 5 mg BID,  Will need lifelong [address] d/t hx of remote DVT. He denies any bleeding/excessive bruising. Stable Hgb 16.1  in 6/2020  3. BP controlled  4. Cardiac care per Dr Jimmy Mattson.     Continue current care and f/u with us as needed    7/28/2020  10:26 AM      Patient seen and examined by me with nurse practitioner. I personally performed all components of the history, physical, and medical decision making and agree with the assessment and plan as noted. Clinically much better after DOAC.  Feels much better in leg. Recommend oral AC along with compression stocking, leg elevation and exercise. Will f/u as up as needed. D/w pt.      Lala Griffin MD

## 2020-07-28 NOTE — PROGRESS NOTES
Chief Complaint   Patient presents with    Follow-up     venous insuffiency     1. Have you been to the ER, urgent care clinic since your last visit? Hospitalized since your last visit? No    2. Have you seen or consulted any other health care providers outside of the [address] [place] [address] since your last visit? Include any pap smears or colon screening.  No

## 2020-08-17 RX ORDER — APIXABAN 5 MG/1
TABLET, FILM COATED ORAL
Qty: 60 TAB | Refills: 0 | Status: SHIPPED | OUTPATIENT
Start: 2020-08-17 | End: 2020-09-12

## 2020-09-12 RX ORDER — APIXABAN 5 MG/1
TABLET, FILM COATED ORAL
Qty: 60 TAB | Refills: 0 | Status: SHIPPED | OUTPATIENT
Start: 2020-09-12 | End: 2020-10-14

## 2020-10-06 ENCOUNTER — OFFICE VISIT (OUTPATIENT)
Dept: INTERNAL MEDICINE CLINIC | Age: 55
End: 2020-10-06
Payer: MEDICARE

## 2020-10-06 VITALS
BODY MASS INDEX: 30.71 KG/M2 | WEIGHT: 239.3 LBS | TEMPERATURE: 97.5 F | HEART RATE: 74 BPM | SYSTOLIC BLOOD PRESSURE: 122 MMHG | RESPIRATION RATE: 16 BRPM | DIASTOLIC BLOOD PRESSURE: 78 MMHG | OXYGEN SATURATION: 97 % | HEIGHT: 74 IN

## 2020-10-06 DIAGNOSIS — S80.812D INFECTED ABRASION OF LEFT LOWER EXTREMITY, SUBSEQUENT ENCOUNTER: Primary | ICD-10-CM

## 2020-10-06 DIAGNOSIS — L08.9 INFECTED ABRASION OF LEFT LOWER EXTREMITY, SUBSEQUENT ENCOUNTER: Primary | ICD-10-CM

## 2020-10-06 PROCEDURE — G8427 DOCREV CUR MEDS BY ELIG CLIN: HCPCS | Performed by: NURSE PRACTITIONER

## 2020-10-06 PROCEDURE — G8432 DEP SCR NOT DOC, RNG: HCPCS | Performed by: NURSE PRACTITIONER

## 2020-10-06 PROCEDURE — 99213 OFFICE O/P EST LOW 20 MIN: CPT | Performed by: NURSE PRACTITIONER

## 2020-10-06 PROCEDURE — 3017F COLORECTAL CA SCREEN DOC REV: CPT | Performed by: NURSE PRACTITIONER

## 2020-10-06 PROCEDURE — G8417 CALC BMI ABV UP PARAM F/U: HCPCS | Performed by: NURSE PRACTITIONER

## 2020-10-06 NOTE — PROGRESS NOTES
Violeta Perdomo is a 50 y.o. male     Chief Complaint   Patient presents with   [address] [place] [address] ED Follow-up     seen at [place] [address] 9/27/20 for leg injury       Visit Vitals  /78 (BP 1 Location: Left arm, BP Patient Position: Sitting)   Pulse 74   Temp 97.5 °F (36.4 °C) (Oral)   Resp 16   Ht 6' 2\" (1.88 m)   Wt 239 lb 4.8 oz (108.5 kg)   SpO2 97%   BMI 30.72 kg/m²       Health Maintenance Due   Topic Date Due    Statin Therapy  1967    DTaP/Tdap/Td series (1 - Tdap) 03/02/1988    Shingrix Vaccine Age 50> (1 of 2) 03/02/2017    Flu Vaccine (1) 09/01/2020       1. Have you been to the ER, urgent care clinic since your last visit? Hospitalized since your last visit? Yes seen at [place] [address] 9/27/20 for leg injury. 2. Have you seen or consulted any other health care providers outside of the [address] [place] [address] since your last visit? Include any pap smears or colon screening.  No

## 2020-10-06 NOTE — PATIENT INSTRUCTIONS
Scrapes (Abrasions): Care Instructions Your Care Instructions Scrapes (abrasions) are wounds where your skin has been rubbed or torn off. Most scrapes do not go deep into the skin, but some may remove several layers of skin. Scrapes usually don't bleed much, but they may ooze pinkish fluid. Scrapes on the head or face may appear worse than they are. They may bleed a lot because of the good blood supply to this area. Most scrapes heal well and may not need a bandage. They usually heal within 3 to 7 days. A large, deep scrape may take 1 to 2 weeks or longer to heal. A scab may form on some scrapes. Follow-up care is a key part of your treatment and safety. Be sure to make and go to all appointments, and call your doctor if you are having problems. It's also a good idea to know your test results and keep a list of the medicines you take. How can you care for yourself at home? · If your doctor told you how to care for your wound, follow your doctor's instructions. If you did not get instructions, follow this general advice: 
? Wash the scrape with clean water 2 times a day. Don't use hydrogen peroxide or alcohol, which can slow healing. ? You may cover the scrape with a thin layer of petroleum jelly, such as Vaseline, and a nonstick bandage. ? Apply more petroleum jelly and replace the bandage as needed. · Prop up the injured area on a pillow anytime you sit or lie down during the next 3 days. Try to keep it above the level of your heart. This will help reduce swelling. · Be safe with medicines. Take pain medicines exactly as directed. ? If the doctor gave you a prescription medicine for pain, take it as prescribed. ? If you are not taking a prescription pain medicine, ask your doctor if you can take an over-the-counter medicine. When should you call for help? Call your doctor now or seek immediate medical care if: 
  · You have signs of infection, such as: ? Increased pain, swelling, warmth, or redness around the scrape. ? Red streaks leading from the scrape. ? Pus draining from the scrape. ? A fever.  
  · The scrape starts to bleed, and blood soaks through the bandage. Oozing small amounts of blood is normal.  
Watch closely for changes in your health, and be sure to contact your doctor if the scrape is not getting better each day. Where can you learn more? Go to http://www.gray.com/ Enter A374 in the search box to learn more about \"Scrapes (Abrasions): Care Instructions. \" Current as of: June 26, 2019               Content Version: 12.6 © 2574-7586 [employer]. Care instructions adapted under license by [employer] (which disclaims liability or warranty for this information). If you have questions about a medical condition or this instruction, always ask your healthcare professional. [address] any warranty or liability for your use of this information.

## 2020-10-06 NOTE — PROGRESS NOTES
Subjective:  Mr. Cody Ingram is a pleasant 53-year-old gentleman with Parkinson's disease, who comes in today for follow up of injury that he sustained to his left lower extremity. He does have rather marked venous insufficiency bilaterally. He tells me that about ten days ago he scraped the lateral border of his left lower leg against the washing machine. He initially did not pay much attention to his injury, however in the days that followed, he developed some purulent drainage. Ten days ago he was seen at [facility] on [address], at which time he was started on Doxycycline. He has now finished a course of antibiotic and is coming in just for me to recheck his leg, although he tells me it is looking much better and he has had no further drainage. Past Medical History:   Diagnosis Date    Kidney disease     Movement disorder     Nephrolithiasis     Neurological disorder     Other ill-defined conditions(799.89)     Parkinson disease    Parkinson disease ([facility] [address]      Past Surgical History:   Procedure Laterality Date    HX VEIN STRIPPING      HX WISDOM TEETH EXTRACTION         Current Outpatient Medications on File Prior to Visit   Medication Sig Dispense Refill    Eliquis 5 mg tablet TAKE 1 TABLET BY MOUTH TWICE DAILY 60 Tab 0    carbidopa-levodopa (SINEMET)  mg per tablet TAKE 1.5 TABLETS BY MOUTH 5 TIMES DAILY (Patient taking differently: TAKE 1.5 TABLETS BY MOUTH 3 TIMES DAILY) 675 Tab 1    trihexyphenidyL (ARTANE) 2 mg tablet Take 1 Tab by mouth four (4) times daily. (Patient taking differently: Take 2 mg by mouth three (3) times daily.) 360 Tab 1    amantadine HCL (Gocovri) 137 mg cp24 Take 2 Caps by mouth nightly. 60 Cap 11    rOPINIRole (REQUIP) 1 mg tablet Take 1 Tab by mouth three (3) times daily. 270 Tab 5    ergocalciferol (ERGOCALCIFEROL) 1,250 mcg (50,000 unit) capsule Take 1 Cap by mouth every seven (7) days.  12 Cap 0     No current facility-administered medications on file prior to visit. No Known Allergies    Physical Examination:  GENERAL:  Pleasant gentleman in no acute distress. He is alert and oriented. He answers my questions appropriately. VITALS:  Blood pressure:  122/78. Pulse:  74.  Respirations:  16.  Temperature:  97.5. O2 sat:  97. HEENT:  Normocephalic, atraumatic. NECK:  Supple without adenopathy. No thyromegaly or carotid bruits. CHEST:  Lungs clear to auscultation, no rales or wheezes. CV:  Heart regular rhythm. EXTREMITIES:  He does have trace pitting edema in both lower extremities. He has marked venous changes bilaterally. On the lateral border of his left lower leg, mid calf, there is an area measuring approximately 1 cm that is scabbed over. It is non tender. There is no drainage. There is no extended cellulitis. Impression:  1. Well healed wound, left lower extremity. Plan:  1. He is reassured that there is certainly no evidence of infection. He will follow up as needed.

## 2020-10-14 RX ORDER — APIXABAN 5 MG/1
TABLET, FILM COATED ORAL
Qty: 60 TAB | Refills: 0 | Status: SHIPPED | OUTPATIENT
Start: 2020-10-14 | End: 2020-11-13

## 2020-10-16 DIAGNOSIS — M54.40 BACK PAIN OF LUMBAR REGION WITH SCIATICA: ICD-10-CM

## 2020-10-16 DIAGNOSIS — G20 PARKINSON'S DISEASE (HCC): ICD-10-CM

## 2020-10-16 DIAGNOSIS — M54.2 NECK PAIN: ICD-10-CM

## 2020-10-16 DIAGNOSIS — G24.9 DYSKINESIA DUE TO PARKINSON'S DISEASE (HCC): ICD-10-CM

## 2020-10-16 DIAGNOSIS — M47.812 CERVICAL SPONDYLOSIS WITHOUT MYELOPATHY: ICD-10-CM

## 2020-10-16 DIAGNOSIS — M54.16 LUMBAR BACK PAIN WITH RADICULOPATHY AFFECTING LEFT LOWER EXTREMITY: ICD-10-CM

## 2020-10-16 DIAGNOSIS — G20 DYSKINESIA DUE TO PARKINSON'S DISEASE (HCC): ICD-10-CM

## 2020-10-16 RX ORDER — ROPINIROLE 1 MG/1
1 TABLET, FILM COATED ORAL 3 TIMES DAILY
Qty: 270 TAB | Refills: 5 | Status: SHIPPED | OUTPATIENT
Start: 2020-10-16 | End: 2021-12-31

## 2020-10-16 NOTE — TELEPHONE ENCOUNTER
Future Appointments   Date Time Provider Maira Artis   1/29/2021  9:30 AM Riccardo Villela MD [facility]                         Last Appointment My Department:  Visit date not found    Please advise of refill below. Requested Prescriptions     Pending Prescriptions Disp Refills    rOPINIRole (REQUIP) 1 mg tablet 270 Tab 5     Sig: Take 1 Tab by mouth three (3) times daily.

## 2020-11-13 RX ORDER — APIXABAN 5 MG/1
TABLET, FILM COATED ORAL
Qty: 60 TAB | Refills: 0 | Status: SHIPPED | OUTPATIENT
Start: 2020-11-13 | End: 2020-12-14

## 2020-12-14 RX ORDER — APIXABAN 5 MG/1
TABLET, FILM COATED ORAL
Qty: 60 TAB | Refills: 0 | Status: SHIPPED | OUTPATIENT
Start: 2020-12-14 | End: 2021-01-12

## 2021-01-12 RX ORDER — APIXABAN 5 MG/1
TABLET, FILM COATED ORAL
Qty: 60 TAB | Refills: 0 | Status: SHIPPED | OUTPATIENT
Start: 2021-01-12 | End: 2021-02-11

## 2021-02-11 RX ORDER — APIXABAN 5 MG/1
TABLET, FILM COATED ORAL
Qty: 60 TAB | Refills: 0 | Status: SHIPPED | OUTPATIENT
Start: 2021-02-11 | End: 2021-03-15

## 2021-03-15 RX ORDER — APIXABAN 5 MG/1
TABLET, FILM COATED ORAL
Qty: 60 TAB | Refills: 0 | Status: SHIPPED | OUTPATIENT
Start: 2021-03-15 | End: 2021-04-12

## 2021-04-12 RX ORDER — APIXABAN 5 MG/1
TABLET, FILM COATED ORAL
Qty: 60 TAB | Refills: 0 | Status: SHIPPED | OUTPATIENT
Start: 2021-04-12 | End: 2021-05-12

## 2021-05-04 DIAGNOSIS — M54.2 NECK PAIN: ICD-10-CM

## 2021-05-04 DIAGNOSIS — G20 DYSKINESIA DUE TO PARKINSON'S DISEASE (HCC): ICD-10-CM

## 2021-05-04 DIAGNOSIS — G24.9 DYSKINESIA DUE TO PARKINSON'S DISEASE (HCC): ICD-10-CM

## 2021-05-04 DIAGNOSIS — M54.16 LUMBAR BACK PAIN WITH RADICULOPATHY AFFECTING LEFT LOWER EXTREMITY: ICD-10-CM

## 2021-05-04 DIAGNOSIS — M47.812 CERVICAL SPONDYLOSIS WITHOUT MYELOPATHY: ICD-10-CM

## 2021-05-04 DIAGNOSIS — M54.40 BACK PAIN OF LUMBAR REGION WITH SCIATICA: ICD-10-CM

## 2021-05-04 DIAGNOSIS — G20 PARKINSON'S DISEASE (HCC): ICD-10-CM

## 2021-05-04 RX ORDER — TRIHEXYPHENIDYL HYDROCHLORIDE 2 MG/1
2 TABLET ORAL 4 TIMES DAILY
Qty: 120 TAB | Refills: 0 | Status: SHIPPED | OUTPATIENT
Start: 2021-05-04

## 2021-05-04 NOTE — TELEPHONE ENCOUNTER
Patient's last appointment was 10/21/2019 with no appointment scheduled    Please sign off on script for patient advising appointment is required

## 2021-05-12 RX ORDER — APIXABAN 5 MG/1
TABLET, FILM COATED ORAL
Qty: 60 TAB | Refills: 0 | Status: SHIPPED | OUTPATIENT
Start: 2021-05-12 | End: 2021-06-11

## 2021-06-11 RX ORDER — APIXABAN 5 MG/1
TABLET, FILM COATED ORAL
Qty: 60 TABLET | Refills: 0 | Status: SHIPPED | OUTPATIENT
Start: 2021-06-11 | End: 2021-07-03

## 2021-07-03 RX ORDER — APIXABAN 5 MG/1
TABLET, FILM COATED ORAL
Qty: 60 TABLET | Refills: 0 | Status: SHIPPED | OUTPATIENT
Start: 2021-07-03 | End: 2021-08-02

## 2021-08-02 RX ORDER — APIXABAN 5 MG/1
TABLET, FILM COATED ORAL
Qty: 60 TABLET | Refills: 0 | Status: SHIPPED | OUTPATIENT
Start: 2021-08-02 | End: 2021-09-01

## 2021-08-03 PROBLEM — R55 SYNCOPE AND COLLAPSE: Status: RESOLVED | Noted: 2019-10-21 | Resolved: 2021-08-03

## 2021-08-17 NOTE — TELEPHONE ENCOUNTER
Patient would now like to get back on the neupro patches as they worked better that the requip. Had to stop in the past due to expense.     He already has a refill but wants you to give the ok for him to switch back 22-Aug-2021

## 2021-09-01 RX ORDER — APIXABAN 5 MG/1
TABLET, FILM COATED ORAL
Qty: 60 TABLET | Refills: 0 | Status: SHIPPED | OUTPATIENT
Start: 2021-09-01 | End: 2021-09-30

## 2021-09-30 RX ORDER — APIXABAN 5 MG/1
TABLET, FILM COATED ORAL
Qty: 60 TABLET | Refills: 0 | Status: SHIPPED | OUTPATIENT
Start: 2021-09-30 | End: 2021-10-30

## 2021-10-30 RX ORDER — APIXABAN 5 MG/1
TABLET, FILM COATED ORAL
Qty: 60 TABLET | Refills: 0 | Status: SHIPPED | OUTPATIENT
Start: 2021-10-30 | End: 2021-12-07 | Stop reason: SDUPTHER

## 2021-12-07 ENCOUNTER — TELEPHONE (OUTPATIENT)
Dept: CARDIOLOGY CLINIC | Age: 56
End: 2021-12-07

## 2021-12-07 NOTE — TELEPHONE ENCOUNTER
Pt needs a refill on his eliquis 5 mg ,swetha send it [facility] on [address] # [phone]. Please call him back at [phone].     Thanks Dayanara

## 2021-12-07 NOTE — TELEPHONE ENCOUNTER
LV 7/2020 he is requesting refill of Eliquis . His PCP is retiring will you cover this request and does he need to be seen?

## 2021-12-31 DIAGNOSIS — G20 PARKINSON'S DISEASE (HCC): ICD-10-CM

## 2021-12-31 DIAGNOSIS — M54.16 LUMBAR BACK PAIN WITH RADICULOPATHY AFFECTING LEFT LOWER EXTREMITY: ICD-10-CM

## 2021-12-31 DIAGNOSIS — G20 DYSKINESIA DUE TO PARKINSON'S DISEASE (HCC): ICD-10-CM

## 2021-12-31 DIAGNOSIS — M54.40 BACK PAIN OF LUMBAR REGION WITH SCIATICA: ICD-10-CM

## 2021-12-31 DIAGNOSIS — G24.9 DYSKINESIA DUE TO PARKINSON'S DISEASE (HCC): ICD-10-CM

## 2021-12-31 DIAGNOSIS — M47.812 CERVICAL SPONDYLOSIS WITHOUT MYELOPATHY: ICD-10-CM

## 2021-12-31 DIAGNOSIS — M54.2 NECK PAIN: ICD-10-CM

## 2021-12-31 RX ORDER — ROPINIROLE 1 MG/1
TABLET, FILM COATED ORAL
Qty: 270 TABLET | Refills: 5 | Status: SHIPPED | OUTPATIENT
Start: 2021-12-31

## 2022-02-01 NOTE — TELEPHONE ENCOUNTER
Pr needs a prescription for his Timur Zarco will run out in 10 days please call him at [phone].     Thanks Dayanara

## 2022-02-02 RX ORDER — APIXABAN 5 MG/1
TABLET, FILM COATED ORAL
Qty: 60 TABLET | Refills: 1 | Status: SHIPPED | OUTPATIENT
Start: 2022-02-02 | End: 2022-04-27 | Stop reason: SDUPTHER

## 2022-02-13 NOTE — PROGRESS NOTES
laexandra  Subjective:     Chief Complaint   Patient presents with   [address]       Yesy Miller is a 49 y.o. M. 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 He was seen in July 2020 in cardiology for routine follow-up. Previous duplex ultrasound had demonstrated occlusive thrombi in the right distal femoral vein and the greater saphenous vein on the right, and he was felt to be doing well while taking Eliquis. He was advised to continue with lifelong anticoagulation. His blood pressure was felt to be well controlled. More recently, he was seen in October 2020 for follow-up after an injury to his left leg; he had scraped the lateral border of his left leg against a washing machine which had subsequently developed into an infection associated with purulent drainage. He had just finished his course of doxycycline and was felt to be doing much better overall at the time. He has not been followed up since that time. Today, the patient comes in for establishment and an acute complaint of bilateral hearing loss from what he believes is earwax impaction. Reports some fine otherwise. He saw his neurologist yesterday, and was felt to be doing well with regard to his Parkinson's disease; no changes were made to his medication regimen. With regard to the hearing loss, he says that starting about 2 months ago he started having a sensation of fullness in the bilateral ears. He will sometimes have difficulty with hearing, and was told that he had wax in his ears. He denies any tinnitus, vertigo, dizziness, or changes with gait. He furthermore denies any focal neurological symptoms, and says that other than the occasional stuffiness in his ears he has felt fine.   No antecedent upper respiratory tract infections, fevers, chills, or other constitutional or systemic complaints. He continues on Eliquis for his history of recurrent deep venous thrombosis. He denies any bleeding or bruising sequelae. His next colonoscopy is due in June, and he is willing to get his vaccinations but requests to reschedule these. His review of systems is otherwise unremarkable. 10-year Cardiovascular Risk Getachew Ryder, 2013): The 10-year ASCVD risk score (Jae Erickson, et al., 2013) is: 2.8%    Values used to calculate the score:      Age: 49 years      Sex: Male      Is Non- : No      Diabetic: No      Tobacco smoker: No      Systolic Blood Pressure: 390 mmHg      Is BP treated: No      HDL Cholesterol: 59 mg/dL      Total Cholesterol: 169 mg/dL       Past Medical History:  Past Medical History:   Diagnosis Date    Asymptomatic carotid artery stenosis, bilateral     Cervical spondylosis     Current use of long term anticoagulation     DVT, lower extremity, recurrent (HCC)     Lumbago of lumbar region with sciatica     Nephrolithiasis     Parkinson disease ([facility] [address]        Past Surgical Histor:  Past Surgical History:   Procedure Laterality Date    HX VEIN STRIPPING      HX WISDOM TEETH EXTRACTION         Allergies:  No Known Allergies    Medications:  Current Outpatient Medications   Medication Sig Dispense Refill    amantadine HCL (SYMMETREL) 100 mg capsule       Eliquis 5 mg tablet TAKE 1 TABLET BY MOUTH TWICE DAILY 60 Tablet 1    rOPINIRole (REQUIP) 1 mg tablet TAKE 1 TABLET BY MOUTH THREE TIMES DAILY 270 Tablet 5    trihexyphenidyL (ARTANE) 2 mg tablet Take 1 Tab by mouth four (4) times daily.  PATIENT MUST HAVE AN APPOINTMENT FOR FURTHER REFILLS 120 Tab 0    carbidopa-levodopa (SINEMET)  mg per tablet TAKE 1.5 TABLETS BY MOUTH 5 TIMES DAILY (Patient taking differently: TAKE 1.5 TABLETS BY MOUTH 3 TIMES DAILY) 675 Tab 1    ergocalciferol (ERGOCALCIFEROL) 1,250 mcg (50,000 unit) capsule Take 1 Cap by mouth every seven (7) days. 12 Cap 0       Social History:  Social History     Socioeconomic History    Marital status:    Tobacco Use    Smoking status: Never Smoker    Smokeless tobacco: Never Used   Substance and Sexual Activity    Alcohol use: Yes     Comment: rare    Drug use: No    Sexual activity: Not Currently       Family History:  Family History   Problem Relation Age of Onset    Hypertension Mother     Heart Disease Mother     Parkinsonism Father     OSTEOARTHRITIS Father     Seizures Sister        Immunizations: There is no immunization history on file for this patient. Healthcare Maintenance:  Health Maintenance   Topic Date Due    Hepatitis C Screening  Never done    COVID-19 Vaccine (1) Never done    DTaP/Tdap/Td series (1 - Tdap) Never done    Shingrix Vaccine Age 50> (1 of 2) Never done    Medicare Yearly Exam  07/01/2021    Flu Vaccine (1) Never done    Depression Screen  10/06/2021    Colorectal Cancer Screening Combo  06/07/2022    Lipid Screen  06/30/2025    Pneumococcal 0-64 years  Aged Out        Review of Systems:  ROS:  Review of Systems   Constitutional: Negative. HENT: Positive for hearing loss. Negative for congestion, ear discharge, ear pain, nosebleeds, sinus pain, sore throat and tinnitus. Eyes: Negative. Respiratory: Negative. Negative for stridor. Cardiovascular: Negative. Gastrointestinal: Negative. Genitourinary: Negative. Musculoskeletal: Negative. Skin: Negative. Neurological: Positive for tremors. Negative for dizziness, tingling, sensory change, speech change, focal weakness, seizures, loss of consciousness, weakness and headaches. Endo/Heme/Allergies: Negative. Psychiatric/Behavioral: Negative.        ROS otherwise negative      Objective:     Vital Signs:  Visit Vitals  /78 (BP 1 Location: Left upper arm, BP Patient Position: Sitting, BP Cuff Size: Adult)   Pulse (!) 46   Temp 97.5 °F (36.4 °C) (Oral) Resp 16   Ht 6' 2\" (1.88 m)   Wt 246 lb 14.4 oz (112 kg)   SpO2 97%   BMI 31.70 kg/m²       BMI:  Body mass index is 31.7 kg/m². Physical Examination:  Physical Exam  Vitals reviewed. Constitutional:       Appearance: Normal appearance. HENT:      Head: Normocephalic and atraumatic. Right Ear: External ear normal. There is impacted cerumen. Left Ear: External ear normal. There is impacted cerumen. Nose: Nose normal.      Mouth/Throat:      Mouth: Mucous membranes are moist.   Eyes:      Extraocular Movements: Extraocular movements intact. Conjunctiva/sclera: Conjunctivae normal.      Pupils: Pupils are equal, round, and reactive to light. Cardiovascular:      Rate and Rhythm: Normal rate and regular rhythm. Pulses: Normal pulses. Heart sounds: Normal heart sounds. No murmur heard. No friction rub. No gallop. Pulmonary:      Effort: Pulmonary effort is normal. No respiratory distress. Breath sounds: Normal breath sounds. No wheezing, rhonchi or rales. Abdominal:      General: Bowel sounds are normal. There is no distension. Palpations: Abdomen is soft. There is no mass. Tenderness: There is no abdominal tenderness. There is no guarding or rebound. Musculoskeletal:         General: No tenderness, deformity or signs of injury. Normal range of motion. Cervical back: Normal range of motion and neck supple. Right lower leg: No edema. Left lower leg: No edema. Skin:     General: Skin is warm and dry. Findings: No bruising, lesion or rash. Neurological:      General: No focal deficit present. Mental Status: He is alert and oriented to person, place, and time. Mental status is at baseline. Cranial Nerves: No cranial nerve deficit. Sensory: No sensory deficit. Motor: No weakness.       Coordination: Coordination normal.      Gait: Gait abnormal.      Deep Tendon Reflexes: Reflexes normal.      Comments: Baseline shuffling gait, mild head tremor   Psychiatric:         Mood and Affect: Mood normal.         Behavior: Behavior normal.          Physical exam otherwise negative    Diagnostic Testing:    Laboratory Studies:  No visits with results within 1 Year(s) from this visit.    Latest known visit with results is:   Office Visit on 06/30/2020   Component Date Value Ref Range Status    WBC 06/30/2020 5.4  4.1 - 10.9 K/uL Final    RBC 06/30/2020 5.08  4.20 - 6.30 M/uL Final    HGB 06/30/2020 16.1  12.0 - 18.0 g/dL Final    HCT 06/30/2020 47.7  37.0 - 51.0 % Final    MCH 06/30/2020 31.7  26.0 - 32.0 pg Final    MCHC 06/30/2020 33.8  30.0 - 36.0 g/dL Final    MCV 06/30/2020 93.9  80.0 - 97.0 fL Final    RDW 06/30/2020 13.8  % Final    PLATELET 39/29/2873 001.4  140.0 - 440.0 K/uL Final    Neutrophils abs 06/30/2020 3.5  2.0 - 7.8 K/uL Final    Neutrophils 06/30/2020 64.4  37.0 - 92.0 % Final    Monocytes abs-DIF 06/30/2020 0.5  0.0 - 1.8 K/uL Final    MONOCYTES 06/30/2020 9.2  0.1 - 24.0 % Final    Lymphocytes Absolute 06/30/2020 1.4  0.6 - 4.1 K/uL Final    LYMPHOCYTES 06/30/2020 26.4  10.0 - 58.5 % Final    Cholesterol, total 06/30/2020 169  0 - 200 mg/dL Final    Triglyceride 06/30/2020 88  0 - 200 mg/dL Final    HDL Cholesterol 06/30/2020 59  35 - 130 mg/dL Final    VLDL 06/30/2020 18  mg/dL Final    LDL, calculated 06/30/2020 92  0 - 130 mg/dL Final    CHOL/HDL Ratio 06/30/2020 3  0 - 4 Ratio Final    LDL/HDL Ratio 06/30/2020 2  Ratio Final    Glucose 06/30/2020 89  75 - 110 mg/dL Final    BUN 06/30/2020 23.0* 9.0 - 20.0 mg/dL Final    Creatinine 06/30/2020 1.2  0.8 - 1.5 mg/dL Final    Sodium 06/30/2020 139  137 - 145 mmol/L Final    Potassium 06/30/2020 4.2  3.6 - 5.0 mmol/L Final    Chloride 06/30/2020 105  98 - 107 mmol/L Final    CO2 06/30/2020 23.0  22.0 - 32.0 mmol/L Final    Calcium 06/30/2020 9.6  8.4 - 10.2 mg/dl Final    Protein, total 06/30/2020 7.6  6.3 - 8.2 g/dL Final    Albumin 06/30/2020 4.6  3.9 - 5.4 g/dL Final    ALT (SGPT) 06/30/2020 5  0 - 50 U/L Final    AST (SGOT) 06/30/2020 19.0  14.0 - 36.0 U/L Final    Alk. phosphatase 06/30/2020 77  38 - 126 U/L Final    Bilirubin, total 06/30/2020 2.1* 0.2 - 1.3 mg/dL Final    BUN/Creatinine ratio 06/30/2020 19  Ratio Final    GFR est AA 06/30/2020 >60  mL/min/1.73m2 Final    Comment: Using the modified MDRD study equations, GFR calculations are not valid   in patients less than 25years of age.  GFR est non-AA 06/30/2020 >60  mL/min/1.73m2 Final    Comment: Using the modified MDRD study equations, GFR calculations are not valid   in patients less than 25years of age.       Globulin 06/30/2020 3.00   Final    A-G Ratio 06/30/2020 1.5  Ratio Final    Anion gap 06/30/2020 11  mmol/L Final    Hemoglobin A1c 06/30/2020 5.3  4.0 - 5.7 % Final    VITAMIN D, 25-HYDROXY 06/30/2020 16* 30 - 96 ng/mL Final    Previous result checked    TSH, 3rd generation 06/30/2020 1.07  0.34 - 5.60 uIU/mL Final    T4, Free 06/30/2020 1.08  0.58 - 2.30 ng/dL Final    PSA 06/30/2020 0.8  0.0 - 4.0 ng/mL Final    Color 06/30/2020 brown* Pale Yellow - Yellow Final    CLARITY 06/30/2020 clear  Clear Final    Glucose urine, 24 hr 06/30/2020 Negative  Negative Final    Ketone 06/30/2020 1+* Negative Final    Bilirubin 06/30/2020 Negative  Negative Final    Specific gravity 06/30/2020 1.030  1.000 - 1.030 Final    pH (UA) 06/30/2020 6  5 - 7 Final    Blood 06/30/2020 Negative  Negative Final    Protein 06/30/2020 1+* Negative Final    Urobilinogen 06/30/2020 Negative  Negative Final    Nitrites 06/30/2020 Negative  Negative Final    Leukocyte Esterase 06/30/2020 Negative  Negative Final    WBC 06/30/2020 0-3* 0 #/HPF Final    CULTURE SENT    RBC 06/30/2020 0  0 #/HPF Final    Bacteria 06/30/2020 Few* None Seen Final    Spermatozoa 06/30/2020 Occasional* None Seen Final    Urine Culture, Routine 06/30/2020 *  Final Value: Beta hemolytic Streptococcus, group B  25,000-50,000 colony forming units per mL      Comment: Penicillin and ampicillin are drugs of choice for treatment of  beta-hemolytic streptococcal infections. Susceptibility testing of  penicillins and other beta-lactam agents approved by the FDA for  treatment of beta-hemolytic streptococcal infections need not be  performed routinely because nonsusceptible isolates are extremely  rare in any beta-hemolytic streptococcus and have not been reported  for Streptococcus pyogenes (group A). (CLSI)           Radiographic Studies:  XR Results (most recent):  Results from Hospital Encounter encounter on 10/21/19    XR CHEST PA LAT    Narrative  Exam:  2 view chest    Indication: Shortness of breath with exertion. COMPARISON: None    PA and lateral views demonstrate normal heart size. There is no acute process in  the lung fields. The osseous structures are unremarkable. Impression  Impression: No acute process. CHANDLER Results (most recent):  No results found for this or any previous visit. CT Results (most recent):  Results from Hospital Encounter encounter on 12/27/19    CTA CHEST W OR W WO CONT    Narrative  EXAMINATION:  CTA CHEST W OR W WO CONT  CLINICAL INFORMATION:  Dyspnea, on exertion, right lower extremity DVT  COMPARISON: Chest radiograph of 10/21/2019    TECHNIQUE: Precontrast  images were obtained to localize the volume for  acquisition. Multislice helical CT arteriography was performed from the  diaphragm to the thoracic inlet during uneventful rapid bolus intravenous  administration of 100 mL of Isovue  370. Lung and soft tissue windows were  generated. Post processing was performed to include 3D MIP  and coronal and  sagittal reformatted images. CT dose reduction was achieved through the use of a  standardized protocol tailored for this examination and automatic exposure  control for dose modulation.     Findings  DIAGNOSTIC QUALITY: Adequate. PULMONARY EMBOLISM: Large filling defect in the right hilar pulmonary artery  extending to the right lower lobe pulmonary artery and proximal segmental  branches. No other definite filling defects. PULMONARY ARTERIES:  Normal in caliber. LUNG PARENCHYMA: No nodule or mass. No significant airspace disease. Minimal  right lower lobe peripheral airspace disease. .  THYROID: No significant abnormalities. PLEURAL EFFUSION: None. CENTRAL AIRWAYS:  Patent. ADENOPATHY:  None. HEART : Normal heart size. No CT evidence of right heart strain. GREAT VESSELS:  No significant abnormalities. UPPER ABDOMEN:  No significant abnormalities. Small hepatic cyst.  BONES:  No significant abnormalities. Impression  IMPRESSION:    Acute pulmonary thromboembolism in the right hilar pulmonary artery extending to  the right lower lobe. No CT evidence of right heart strain. The findings were called to Dr. Soler Brought on 12/27/2019 at 20:28 hours by Dr. Carolina Sanchez. [facility] Results (most recent):  No results found for this or any previous visit. MRI Results (most recent):  No results found for this or any previous visit. Assessment/Plan:       ICD-10-CM ICD-9-CM    1. Deep vein thrombosis (DVT) of proximal lower extremity, unspecified chronicity, unspecified laterality (HCC)  I82.4Y9 453.41 CBC WITH AUTOMATED DIFF      METABOLIC PANEL, COMPREHENSIVE   2. Routine adult health maintenance  Z00.00 V70.0 HEPATITIS C AB      ZOSTER VACC RECOMBINANT ADJUVANTED      VITAMIN D, 25 HYDROXY      HEMOGLOBIN A1C WITH EAG      CBC WITH AUTOMATED DIFF      METABOLIC PANEL, COMPREHENSIVE      LIPID PANEL   3. [facility]  G20 332.0    4. Acute pulmonary embolism without acute cor pulmonale, unspecified pulmonary embolism type (HCC)  I26.99 415.19    5. Screening for viral disease  Z11.59 V73.99 HEPATITIS C AB   6.  Underimmunization status  Z28.3 V15.83 TETANUS, DIPHTHERIA TOXOIDS AND ACELLULAR PERTUSSIS VACCINE (TDAP), IN INDIVIDS. >=7, IM      INFLUENZA VIRUS VAC QUAD,SPLIT,PRESV FREE SYRINGE IM   7. Current use of long term anticoagulation  Z79.01 V58.61    8. Vitamin D deficiency  E55.9 268.9 VITAMIN D, 25 HYDROXY   9. Encounter for immunization  Z23 V03.89 TETANUS, DIPHTHERIA TOXOIDS AND ACELLULAR PERTUSSIS VACCINE (TDAP), IN INDIVIDS. >=7, IM      INFLUENZA VIRUS VAC QUAD,SPLIT,PRESV FREE SYRINGE IM   10. Asymptomatic carotid artery stenosis, bilateral  I65.23 433.10      433.30    11. Bilateral impacted cerumen  H61.23 380.4 REMOVE IMPACTED EAR WAX   12. Screening for colon cancer  Z12.11 V76.51 REFERRAL TO GASTROENTEROLOGY          In general, this patient with Parkinson's disease is doing relatively well. His blood pressure is at target, and he has had no adverse sequelae from his chronic anticoagulation for his recurrent DVTs. He continues to be followed by neurology for his Parkinson's disease, and while he does have evidence of some bradykinesia and shuffling gait on examination, he is able to do his activities of daily living, and reports that his symptoms have been stable from his neurologist point of view. He is willing to get his vaccinations but will have to come back in for these. Routine screening laboratory studies are ordered; the patient will reschedule these. His examination is notable for bilateral cerumen impaction, and earwax removal was performed during this visit. He does not otherwise have significant red flag symptoms including any tinnitus, or focal neurological symptoms. With his hearing loss being bilateral and likely relatable to cerumen impaction, I do not think that this is an emergency. However, strict return precautions are provided to the patient. Patient is counseled to maintain adherence to therapeutic lifestyle changes including efforts at healthy diet and exercise. He should continue to follow-up with his neurologist for his Parkinson's disease.   Follow-up with me in 1 year or earlier as needed. Over 50% of today's total 45-minute appointment time was spent counseling the patient regarding plan of care, need for vaccines, and other planning. Anna Garcia MD    Please note that this dictation was completed with [employer], the computer voice recognition software. Quite often unanticipated grammatical, syntax, homophones, and other interpretive errors are inadvertently transcribed by the computer software. Please disregard these errors. Please excuse any errors that have escaped final proofreading.

## 2022-02-25 ENCOUNTER — OFFICE VISIT (OUTPATIENT)
Dept: INTERNAL MEDICINE CLINIC | Age: 57
End: 2022-02-25
Payer: MEDICARE

## 2022-02-25 VITALS
HEIGHT: 74 IN | TEMPERATURE: 97.5 F | RESPIRATION RATE: 16 BRPM | HEART RATE: 46 BPM | DIASTOLIC BLOOD PRESSURE: 78 MMHG | SYSTOLIC BLOOD PRESSURE: 110 MMHG | OXYGEN SATURATION: 97 % | WEIGHT: 246.9 LBS | BODY MASS INDEX: 31.69 KG/M2

## 2022-02-25 DIAGNOSIS — G20 PARKINSON'S DISEASE (HCC): ICD-10-CM

## 2022-02-25 DIAGNOSIS — I82.4Y9 DEEP VEIN THROMBOSIS (DVT) OF PROXIMAL LOWER EXTREMITY, UNSPECIFIED CHRONICITY, UNSPECIFIED LATERALITY (HCC): Primary | ICD-10-CM

## 2022-02-25 DIAGNOSIS — I26.99 ACUTE PULMONARY EMBOLISM WITHOUT ACUTE COR PULMONALE, UNSPECIFIED PULMONARY EMBOLISM TYPE (HCC): ICD-10-CM

## 2022-02-25 DIAGNOSIS — Z23 ENCOUNTER FOR IMMUNIZATION: ICD-10-CM

## 2022-02-25 DIAGNOSIS — Z11.59 SCREENING FOR VIRAL DISEASE: ICD-10-CM

## 2022-02-25 DIAGNOSIS — I65.23 ASYMPTOMATIC CAROTID ARTERY STENOSIS, BILATERAL: ICD-10-CM

## 2022-02-25 DIAGNOSIS — Z00.00 ROUTINE ADULT HEALTH MAINTENANCE: ICD-10-CM

## 2022-02-25 DIAGNOSIS — E55.9 VITAMIN D DEFICIENCY: ICD-10-CM

## 2022-02-25 DIAGNOSIS — Z79.01 CURRENT USE OF LONG TERM ANTICOAGULATION: ICD-10-CM

## 2022-02-25 DIAGNOSIS — Z28.39 UNDERIMMUNIZATION STATUS: ICD-10-CM

## 2022-02-25 DIAGNOSIS — Z12.11 SCREENING FOR COLON CANCER: ICD-10-CM

## 2022-02-25 DIAGNOSIS — H61.23 BILATERAL IMPACTED CERUMEN: ICD-10-CM

## 2022-02-25 PROCEDURE — 69210 REMOVE IMPACTED EAR WAX UNI: CPT | Performed by: INTERNAL MEDICINE

## 2022-02-25 PROCEDURE — 99214 OFFICE O/P EST MOD 30 MIN: CPT | Performed by: INTERNAL MEDICINE

## 2022-02-25 RX ORDER — AMANTADINE HYDROCHLORIDE 100 MG/1
CAPSULE, GELATIN COATED ORAL
COMMUNITY
Start: 2022-02-23

## 2022-02-25 NOTE — PATIENT INSTRUCTIONS
Earwax Blockage: Care Instructions  Your Care Instructions     Earwax is a natural substance that protects the ear canal. Normally, earwax drains from the ears and does not cause problems. Sometimes earwax builds up and hardens. Earwax blockage (also called cerumen impaction) can cause some loss of hearing and pain. When wax is tightly packed, you will need to have your doctor remove it. Follow-up care is a key part of your treatment and safety. Be sure to make and go to all appointments, and call your doctor if you are having problems. It's also a good idea to know your test results and keep a list of the medicines you take. How can you care for yourself at home? · Do not try to remove earwax with cotton swabs, fingers, or other objects. This can make the blockage worse and damage the eardrum. · If your doctor recommends that you try to remove earwax at home:  ? Soften and loosen the earwax with warm mineral oil. You also can try hydrogen peroxide mixed with an equal amount of room temperature water. Place 2 drops of the fluid, warmed to body temperature, in the ear two times a day for up to 5 days. ? Once the wax is loose and soft, all that is usually needed to remove it from the ear canal is a gentle, warm shower. Direct the water into the ear, then tip your head to let the earwax drain out. Dry your ear thoroughly with a hair dryer set on low. Hold the dryer several inches from your ear. ? If the warm mineral oil and shower do not work, use an over-the-counter wax softener. Read and follow all instructions on the label. After using the wax softener, use an ear syringe to gently flush the ear. Make sure the flushing solution is body temperature. Cool or hot fluids in the ear can cause dizziness. When should you call for help? Call your doctor now or seek immediate medical care if:    · Pus or blood drains from your ear.     · Your ears are ringing or feel full.     · You have a loss of hearing. Watch closely for changes in your health, and be sure to contact your doctor if:    · You have pain or reduced hearing after 1 week of home treatment.     · You have any new symptoms, such as nausea or balance problems. Where can you learn more? Go to http://www.gray.com/  Enter Q495 in the search box to learn more about \"Earwax Blockage: Care Instructions. \"  Current as of: July 1, 2021               Content Version: 13.0  © 2006-2021 [employer]. Care instructions adapted under license by [employer] (which disclaims liability or warranty for this information). If you have questions about a medical condition or this instruction, always ask your healthcare professional. [address] any warranty or liability for your use of this information. Learning About Colonoscopy  What is a colonoscopy? A colonoscopy is a test (also called a procedure) that lets a doctor look inside your large intestine. The doctor uses a thin, lighted tube called a colonoscope. The doctor uses it to look for small growths called polyps, colon or rectal cancer (colorectal cancer), or other problems like bleeding. During the procedure, the doctor can take samples of tissue. The samples can then be checked for cancer or other conditions. The doctor can also take out polyps. How is a colonoscopy done? This procedure is done in a doctor's office or a clinic or hospital. You will get medicine to help you relax and not feel pain. Some people find that they don't remember having the test because of the medicine. The doctor gently moves the colonoscope, or scope, through the colon. The scope is also a small video camera. It lets the doctor see the colon and take pictures. How do you prepare for the procedure? You need to clean out your colon before the procedure so the doctor can see your colon. This depends on which \"colon prep\" your doctor recommends.   To clean out your colon, you'll do a \"colon prep\" before the test. This means you stop eating solid foods and drink only clear liquids. You can have water, tea, coffee, clear juices, clear broths, flavored ice pops, and gelatin (such as Jell-O). Do not drink anything red or purple. The day or night before the procedure, you drink a large amount of a special liquid. This causes loose, frequent stools. You will go to the bathroom a lot. Your doctor may have you drink part of the liquid the evening before and the rest on the day of the test. It's very important to drink all of the liquid. If you have problems drinking it, call your doctor. Arrange to have someone take you home after the test.  What can you expect after a colonoscopy? Your doctor will tell you when you can eat and do your usual activities. Drink a lot of fluid after the test to replace the fluids you may have lost during the colon prep. But don't drink alcohol. Your doctor will talk to you about when you'll need your next colonoscopy. The results of your test and your risk for colorectal cancer will help your doctor decide how often you need to be checked. After the test, you may be bloated or have gas pains. You may need to pass gas. If a biopsy was done or a polyp was removed, you may have streaks of blood in your stool (feces) for a few days. Check with your doctor to see when it is safe to take aspirin and nonsteroidal anti-inflammatory drugs (NSAIDs) again. Problems such as heavy rectal bleeding may not occur until several weeks after the test. This isn't common. But it can happen after polyps are removed. Follow-up care is a key part of your treatment and safety. Be sure to make and go to all appointments, and call your doctor if you are having problems. It's also a good idea to know your test results and keep a list of the medicines you take. Where can you learn more?   Go to http://www.gray.com/  Enter Z368 in the search box to learn more about \"Learning About Colonoscopy. \"  Current as of: December 17, 2020               Content Version: 13.0  © 3321-0030 [employer]. Care instructions adapted under license by [employer] (which disclaims liability or warranty for this information). If you have questions about a medical condition or this instruction, always ask your healthcare professional. [place] [address] any warranty or liability for your use of this information.

## 2022-02-25 NOTE — PROGRESS NOTES
Chief Complaint   Patient presents with   [employer] Establish Care       Visit Vitals  /78 (BP 1 Location: Left upper arm, BP Patient Position: Sitting, BP Cuff Size: Adult)   Pulse (!) 46   Temp 97.5 °F (36.4 °C) (Oral)   Resp 16   Ht 6' 2\" (1.88 m)   Wt 246 lb 14.4 oz (112 kg)   SpO2 97%   BMI 31.70 kg/m²       1. Have you been to the ER, urgent care clinic since your last visit? Hospitalized since your last visit? No    2. Have you seen or consulted any other health care providers outside of the [address] [place] [address] since your last visit? Include any pap smears or colon screening.  No

## 2022-03-19 PROBLEM — I82.409 DVT (DEEP VENOUS THROMBOSIS) (HCC): Status: ACTIVE | Noted: 2020-01-21

## 2022-03-19 PROBLEM — I65.23 BILATERAL CAROTID ARTERY STENOSIS: Status: ACTIVE | Noted: 2019-10-21

## 2022-03-19 PROBLEM — I87.2 VENOUS INSUFFICIENCY OF BOTH LOWER EXTREMITIES: Status: ACTIVE | Noted: 2020-07-28

## 2022-03-19 PROBLEM — R55 CONVULSIVE SYNCOPE: Status: ACTIVE | Noted: 2019-10-30

## 2022-03-19 PROBLEM — R41.82 ALTERED MENTAL STATUS, UNSPECIFIED: Status: ACTIVE | Noted: 2019-10-30

## 2022-03-19 PROBLEM — I83.893 VARICOSE VEINS OF BOTH LEGS WITH EDEMA: Status: ACTIVE | Noted: 2020-01-21

## 2022-04-26 RX ORDER — APIXABAN 5 MG/1
TABLET, FILM COATED ORAL
Qty: 60 TABLET | Refills: 1 | OUTPATIENT
Start: 2022-04-26

## 2022-04-27 ENCOUNTER — TELEPHONE (OUTPATIENT)
Dept: CARDIOLOGY CLINIC | Age: 57
End: 2022-04-27

## 2022-04-27 NOTE — TELEPHONE ENCOUNTER
Patient need refill, advised must be seen annuall for refill, advise appt for 4-13 @ 10:15am must be kept. Pt says will run out of meds on Tuesday. Please call.      Thanks